# Patient Record
Sex: FEMALE | Race: WHITE | NOT HISPANIC OR LATINO | Employment: UNEMPLOYED | URBAN - NONMETROPOLITAN AREA
[De-identification: names, ages, dates, MRNs, and addresses within clinical notes are randomized per-mention and may not be internally consistent; named-entity substitution may affect disease eponyms.]

---

## 2017-10-04 ENCOUNTER — TRANSCRIBE ORDERS (OUTPATIENT)
Dept: ADMINISTRATIVE | Facility: HOSPITAL | Age: 37
End: 2017-10-04

## 2017-10-04 DIAGNOSIS — N63.0 BREAST MASS: Primary | ICD-10-CM

## 2017-10-19 ENCOUNTER — HOSPITAL ENCOUNTER (OUTPATIENT)
Dept: MAMMOGRAPHY | Facility: HOSPITAL | Age: 37
Discharge: HOME | End: 2017-10-19
Admitting: OBSTETRICS & GYNECOLOGY

## 2017-10-19 DIAGNOSIS — N63.0 BREAST MASS: ICD-10-CM

## 2017-10-19 PROCEDURE — G0279 TOMOSYNTHESIS, MAMMO: HCPCS

## 2017-10-19 PROCEDURE — G0204 DX MAMMO INCL CAD BI: HCPCS

## 2017-10-19 PROCEDURE — 77066 DX MAMMO INCL CAD BI: CPT | Performed by: RADIOLOGY

## 2017-10-19 PROCEDURE — 77062 BREAST TOMOSYNTHESIS BI: CPT | Performed by: RADIOLOGY

## 2017-12-08 ENCOUNTER — TRANSCRIBE ORDERS (OUTPATIENT)
Dept: ADMINISTRATIVE | Facility: HOSPITAL | Age: 37
End: 2017-12-08

## 2017-12-08 DIAGNOSIS — N63.0 LUMP OR MASS IN BREAST: Primary | ICD-10-CM

## 2017-12-21 ENCOUNTER — APPOINTMENT (OUTPATIENT)
Dept: ULTRASOUND IMAGING | Facility: HOSPITAL | Age: 37
End: 2017-12-21

## 2018-01-17 ENCOUNTER — HOSPITAL ENCOUNTER (OUTPATIENT)
Dept: ULTRASOUND IMAGING | Facility: HOSPITAL | Age: 38
Discharge: HOME | End: 2018-01-17

## 2018-01-23 ENCOUNTER — HOSPITAL ENCOUNTER (OUTPATIENT)
Dept: ULTRASOUND IMAGING | Facility: HOSPITAL | Age: 38
Discharge: HOME | End: 2018-01-23
Admitting: OBSTETRICS & GYNECOLOGY

## 2018-01-23 DIAGNOSIS — N63.0 LUMP OR MASS IN BREAST: ICD-10-CM

## 2018-01-23 PROCEDURE — 76642 ULTRASOUND BREAST LIMITED: CPT

## 2018-01-23 PROCEDURE — 76642 ULTRASOUND BREAST LIMITED: CPT | Performed by: RADIOLOGY

## 2018-07-16 ENCOUNTER — OFFICE VISIT (OUTPATIENT)
Dept: ORTHOPEDIC SURGERY | Facility: CLINIC | Age: 38
End: 2018-07-16

## 2018-07-16 ENCOUNTER — PREP FOR SURGERY (OUTPATIENT)
Dept: OTHER | Facility: HOSPITAL | Age: 38
End: 2018-07-16

## 2018-07-16 ENCOUNTER — TELEPHONE (OUTPATIENT)
Dept: ORTHOPEDIC SURGERY | Facility: CLINIC | Age: 38
End: 2018-07-16

## 2018-07-16 VITALS
SYSTOLIC BLOOD PRESSURE: 118 MMHG | HEIGHT: 71 IN | HEART RATE: 83 BPM | DIASTOLIC BLOOD PRESSURE: 69 MMHG | WEIGHT: 149 LBS | BODY MASS INDEX: 20.86 KG/M2

## 2018-07-16 DIAGNOSIS — G56.02 CARPAL TUNNEL SYNDROME OF LEFT WRIST: Primary | ICD-10-CM

## 2018-07-16 DIAGNOSIS — G56.02 LEFT CARPAL TUNNEL SYNDROME: Primary | ICD-10-CM

## 2018-07-16 PROCEDURE — 99204 OFFICE O/P NEW MOD 45 MIN: CPT | Performed by: ORTHOPAEDIC SURGERY

## 2018-07-16 NOTE — H&P
History and Physical    Patient: Keshia Jimenez  YOB: 1980  Date of encounter: 07/16/2018      History of Present Illness:   Keshia Jimenez is a 38 y.o. right-handed white female, nondiabetic, positive smoking history who presents with a 5-6 month history of hand pain numbness tingling and swelling.  No specific injury or trauma.  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  Notices it when she drives also she has to switch her hands back and forth.  Has been wearing wrist splints for about 6 weeks they've helped a little bit with night pain but still bothers her a lot during the day.  No significant change in weight otherwise healthy.    PMH:   Patient Active Problem List   Diagnosis   • Left carpal tunnel syndrome       PSH:  Past Surgical History:   Procedure Laterality Date   • HYSTERECTOMY     • TONSILLECTOMY         Allergies:   No Known Allergies    Medications:     Current Outpatient Prescriptions:   •  METHADONE HCL PO, Take 35 mg by mouth., Disp: , Rfl:     Social History:     Social History     Occupational History   • Not on file.     Social History Main Topics   • Smoking status: Current Every Day Smoker   • Smokeless tobacco: Never Used   • Alcohol use No   • Drug use: Unknown   • Sexual activity: Defer      Social History     Social History Narrative   • No narrative on file       Family History:     Family History   Problem Relation Age of Onset   • Diabetes Father    • Breast cancer Neg Hx        Review of Systems:   Review of Systems   HENT: Negative.    Eyes: Negative.    Respiratory: Negative.    Cardiovascular: Negative.    Gastrointestinal: Negative.    Endocrine: Negative.    Genitourinary: Negative.    Musculoskeletal: Negative.    Skin: Negative.    Neurological: Positive for weakness and numbness.   Hematological: Negative.    Psychiatric/Behavioral: Negative.        Physical Exam:   Constitutional: Patient is  oriented to person, place, and time. Patient appears well-developed and well-nourished. No acute distress.   There were no vitals filed for this visit.    HENT:   Head: Normocephalic and atraumatic.   Right Ear: External ear normal.   Left Ear: External ear normal.   Eyes: EOM are normal. Right eye exhibits no discharge. Left eye exhibits no discharge.   Neck: Normal range of motion. Neck supple.   Cardiovascular: Regular rhythm and normal heart sounds.    No murmur heard.  Pulmonary/Chest: Effort normal. No respiratory distress.Clear to ascultation bilaterally.  Abdominal: Soft.   Musculoskeletal:Hands show no obvious swelling the skin is intact.  She has reasonable  strength bilaterally.  Negative Tinel sign.  Positive Phalen's test about 20 seconds.  Good range of motion of elbow and shoulders.    Neurological: Patient is alert and oriented to person, place, and time.   Skin: Skin is warm and dry. No rash noted. Patient is not diaphoretic.   Psychiatric: Patinet has a normal mood and affect. Patients behavior is normal. Thought content normal.     Radiology:     EMG nerve conduction velocity test are consistent with fairly significant carpal tunnel syndrome bilaterally       Assessment  Bilateral carpal tunnel syndrome clinically left is greater than right.  She's been recalcitrant to conservative treatment including splinting and NSAIDs.       Plan:  Plan for surgical release of left carpal tunnel.  The risk and benefits were discussed including infection, bleeding, failure to relieve all her symptoms, recurrence, thenar eminence pain, local nerve damage, prolonged rehabilitation etc. patient appears be well where the risk and benefits and consents to procedure.  We'll plan to do this as an outpatient in about 9 days.

## 2018-07-16 NOTE — PROGRESS NOTES
History and Physical      Patient: Keshia Jimenez  YOB: 1980  Date of Encounter: 07/16/2018      HPI: 38-year-old right-handed white female, nondiabetic, positive smoking history who presents with a 5-6 month history of hand pain numbness tingling and swelling.  No specific injury or trauma.  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  Notices it when she drives also she has to switch her hands back and forth.  Has been wearing wrist splints for about 6 weeks they've helped a little bit with night pain but still bothers her a lot during the day.  No significant change in weight otherwise healthy.        History reviewed. No pertinent past medical history.    Past Surgical History:   Procedure Laterality Date   • HYSTERECTOMY     • TONSILLECTOMY         Family History   Problem Relation Age of Onset   • Diabetes Father    • Breast cancer Neg Hx        Social History     Social History   • Marital status:      Spouse name: N/A   • Number of children: N/A   • Years of education: N/A     Occupational History   • Not on file.     Social History Main Topics   • Smoking status: Current Every Day Smoker   • Smokeless tobacco: Never Used   • Alcohol use No   • Drug use: Unknown   • Sexual activity: Defer     Other Topics Concern   • Not on file     Social History Narrative   • No narrative on file       Current Outpatient Prescriptions   Medication Sig Dispense Refill   • METHADONE HCL PO Take 35 mg by mouth.       No current facility-administered medications for this visit.        No Known Allergies    ROS no neck or back problems.  No shortness of breath tightness or wheezing.  No abdominal pain and nausea or vomiting.  No change in mood nervousness or anxiety.  No urinary symptoms.  No headaches fevers chills.  Review of systems is unremarkable other than chief complaint      Vitals:    07/16/18 1008   BP: 118/69   Pulse: 83   Weight: 67.6  "kg (149 lb)   Height: 180.3 cm (71\")     Body mass index is 20.78 kg/m².          Physical Exam   Normal weight white female in no apparent acute distress awake alert and oriented ×3  HEENT normocephalic atraumatic, pupils equal round reactive to light, extraocular movements are intact, sclerae anicteric, tongue is midline with good dentition  Neck is nontender good range of motion no JVD or bruits  Lungs equal bilaterally  Abdomen soft nontender  Heart regular rate and rhythm without significant rubs murmurs or gallops  Lower extremities show no focal motor or neurologic deficits.  There is no obvious swelling.  Normal patellar tendon reflexes  Hands show no obvious swelling the skin is intact.  She has reasonable  strength bilaterally.  Negative Tinel sign.  Positive Phalen's test about 20 seconds.  Good range of motion of elbow and shoulders.    Radiology/Labs:     EMG nerve conduction velocity test are consistent with fairly significant carpal tunnel syndrome bilaterally      Procedures          Assessment: Bilateral carpal tunnel syndrome clinically left is greater than right.  She's been recalcitrant to conservative treatment including splinting and NSAIDs.      Plan: Plan for surgical release of left carpal tunnel.  The risk and benefits were discussed including infection, bleeding, failure to relieve all her symptoms, recurrence, thenar eminence pain, local nerve damage, prolonged rehabilitation etc. patient appears be well where the risk and benefits and consents to procedure.  We'll plan to do this as an outpatient in about 9 days.              Discussion/Summary:          "

## 2018-07-18 ENCOUNTER — TELEPHONE (OUTPATIENT)
Dept: ORTHOPEDIC SURGERY | Facility: CLINIC | Age: 38
End: 2018-07-18

## 2018-07-18 NOTE — TELEPHONE ENCOUNTER
Patient called today asking if she could take her methadone the morning of her SX. She was told that her pre-op nurses would advise her as to what she can or can not take the morning of her SX. Patient verbalized understanding.

## 2018-07-20 ENCOUNTER — APPOINTMENT (OUTPATIENT)
Dept: PREADMISSION TESTING | Facility: HOSPITAL | Age: 38
End: 2018-07-20

## 2018-07-20 LAB
ANION GAP SERPL CALCULATED.3IONS-SCNC: 2.9 MMOL/L (ref 3.6–11.2)
BUN BLD-MCNC: 14 MG/DL (ref 7–21)
BUN/CREAT SERPL: 20 (ref 7–25)
CALCIUM SPEC-SCNC: 9.1 MG/DL (ref 7.7–10)
CHLORIDE SERPL-SCNC: 106 MMOL/L (ref 99–112)
CO2 SERPL-SCNC: 28.1 MMOL/L (ref 24.3–31.9)
CREAT BLD-MCNC: 0.7 MG/DL (ref 0.43–1.29)
DEPRECATED RDW RBC AUTO: 44 FL (ref 37–54)
ERYTHROCYTE [DISTWIDTH] IN BLOOD BY AUTOMATED COUNT: 13.6 % (ref 11.5–14.5)
GFR SERPL CREATININE-BSD FRML MDRD: 94 ML/MIN/1.73
GLUCOSE BLD-MCNC: 91 MG/DL (ref 70–110)
HCT VFR BLD AUTO: 43.3 % (ref 37–47)
HGB BLD-MCNC: 14.6 G/DL (ref 12–16)
MCH RBC QN AUTO: 30 PG (ref 27–33)
MCHC RBC AUTO-ENTMCNC: 33.7 G/DL (ref 33–37)
MCV RBC AUTO: 89.1 FL (ref 80–94)
OSMOLALITY SERPL CALC.SUM OF ELEC: 273.9 MOSM/KG (ref 273–305)
PLATELET # BLD AUTO: 248 10*3/MM3 (ref 130–400)
PMV BLD AUTO: 10.2 FL (ref 6–10)
POTASSIUM BLD-SCNC: 4.1 MMOL/L (ref 3.5–5.3)
RBC # BLD AUTO: 4.86 10*6/MM3 (ref 4.2–5.4)
SODIUM BLD-SCNC: 137 MMOL/L (ref 135–153)
WBC NRBC COR # BLD: 7.38 10*3/MM3 (ref 4.5–12.5)

## 2018-07-20 PROCEDURE — 36415 COLL VENOUS BLD VENIPUNCTURE: CPT

## 2018-07-20 PROCEDURE — 85027 COMPLETE CBC AUTOMATED: CPT | Performed by: ORTHOPAEDIC SURGERY

## 2018-07-20 PROCEDURE — 80048 BASIC METABOLIC PNL TOTAL CA: CPT | Performed by: ORTHOPAEDIC SURGERY

## 2018-07-20 NOTE — DISCHARGE INSTRUCTIONS
TAKE the following medications the morning of surgery:  All heart or blood pressure medications    Please discontinue all blood thinners and anticoagulants (except aspirin) prior to surgery as per your surgeon and cardiologist instructions.  Aspirin may be continued up to the day prior to surgery.    HOLD all diabetic medications the morning of surgery as order by physician.    Please follow instructions on use of prep cloths provided by nurse. Return instruction sheet with stickers attached to pre-op nurse on day of surgery.    Arrival time for surgery on 7/25/2018 is 0830 am.    General Instructions:  • Do NOT eat or drink after midnight 7/24/2018 which includes water, mints, or gum.  • You may brush your teeth. Dental appliances that are removable must be taken out day of surgery.  • Do NOT smoke, chew tobacco, or drink alcohol within 24 hours prior to surgery.  • Bring medications in original bottles, any inhalers and if applicable your C-PAP/BI-PAP machine  • Bring any papers given to you in the doctor’s office  • Wear clean, comfortable clothes and socks  • Do NOT wear contact lenses or make-up or dark nail polish.  Bring a case for your glasses if applicable.  • Bring crutches or walker if applicable  • Leave all other valuables and jewelry at home  • If you were given a blood bank armband, continue to wear it until discharged.    Preventing a Surgical Site Infection:  • Shower the night before surgery (unless instructed otherwise) using a fresh bar of anti-bacterial soap (such as Dial) and clean washcloth.  Dry with a clean towel and dress in clean clothing.  • For 2 to 3 days before surgery, avoid shaving with a razor near where you will have surgery because the razor can irritate skin and make it easier to develop an infection.  Ask your surgeon if you will be receiving antibiotics prior to surgery.  • Make sure you, your family, and all healthcare providers clean their hands with soap and water or an  alcohol-based hand  before caring for you or your wound.  • If at all possible, quit smoking as many days before surgery as you can.    Day of Surgery:  Upon arrival, a pre-op nurse and anesthesiologist will review your health history, obtain vital signs, and answer questions you may have.  The only belongings needed at this time will be your home medications and if applicable you C-PAP/BI-PAP machine.  If you are staying overnight, your family can leave the rest of your belongings in the car and bring them to your room later.  A pre-op nurse will start an IV and you may receive medication in preparation for surgery.  Due to patient privacy and limited space, only one member of your family will be able to accompany you in the pre-op area.  While you are in surgery your family should notify the waiting room  if they leave the waiting room area and provide a contact number.  Please be aware that surgery does come with discomfort.  We want to make every effort to control your discomfort so please discuss any uncontrolled symptoms with your nurse.  Your doctor will most likely have prescribed pain medications.  If you are going home after surgery you will receive individualized written care instructions before being discharged.  A responsible adult must drive you to and from the hospital on the day of surgery and stay with you for 24 hours.  If you are staying overnight following surgery, you will be transported to your hospital room following the recovery period.

## 2018-07-24 ENCOUNTER — TELEPHONE (OUTPATIENT)
Dept: ORTHOPEDIC SURGERY | Facility: CLINIC | Age: 38
End: 2018-07-24

## 2018-07-25 ENCOUNTER — HOSPITAL ENCOUNTER (OUTPATIENT)
Facility: HOSPITAL | Age: 38
Setting detail: HOSPITAL OUTPATIENT SURGERY
Discharge: HOME | End: 2018-07-25
Attending: ORTHOPAEDIC SURGERY | Admitting: ORTHOPAEDIC SURGERY

## 2018-07-25 ENCOUNTER — ANESTHESIA EVENT (OUTPATIENT)
Dept: PERIOP | Facility: HOSPITAL | Age: 38
End: 2018-07-25

## 2018-07-25 ENCOUNTER — ANESTHESIA (OUTPATIENT)
Dept: PERIOP | Facility: HOSPITAL | Age: 38
End: 2018-07-25

## 2018-07-25 VITALS
HEART RATE: 66 BPM | BODY MASS INDEX: 20.86 KG/M2 | SYSTOLIC BLOOD PRESSURE: 111 MMHG | OXYGEN SATURATION: 99 % | HEIGHT: 71 IN | WEIGHT: 149 LBS | DIASTOLIC BLOOD PRESSURE: 58 MMHG | RESPIRATION RATE: 16 BRPM | TEMPERATURE: 97.6 F

## 2018-07-25 PROCEDURE — 25010000003 CEFAZOLIN PER 500 MG: Performed by: PHYSICIAN ASSISTANT

## 2018-07-25 PROCEDURE — 25010000002 KETOROLAC TROMETHAMINE PER 15 MG: Performed by: NURSE ANESTHETIST, CERTIFIED REGISTERED

## 2018-07-25 PROCEDURE — 25010000002 MIDAZOLAM PER 1 MG: Performed by: NURSE ANESTHETIST, CERTIFIED REGISTERED

## 2018-07-25 PROCEDURE — 25010000002 ONDANSETRON PER 1 MG: Performed by: NURSE ANESTHETIST, CERTIFIED REGISTERED

## 2018-07-25 PROCEDURE — 64721 CARPAL TUNNEL SURGERY: CPT | Performed by: ORTHOPAEDIC SURGERY

## 2018-07-25 PROCEDURE — 25010000002 PROPOFOL 10 MG/ML EMULSION: Performed by: NURSE ANESTHETIST, CERTIFIED REGISTERED

## 2018-07-25 PROCEDURE — 25010000002 FENTANYL CITRATE (PF) 100 MCG/2ML SOLUTION: Performed by: NURSE ANESTHETIST, CERTIFIED REGISTERED

## 2018-07-25 RX ORDER — MAGNESIUM HYDROXIDE 1200 MG/15ML
LIQUID ORAL AS NEEDED
Status: DISCONTINUED | OUTPATIENT
Start: 2018-07-25 | End: 2018-07-25 | Stop reason: HOSPADM

## 2018-07-25 RX ORDER — OXYCODONE HYDROCHLORIDE AND ACETAMINOPHEN 5; 325 MG/1; MG/1
1 TABLET ORAL ONCE AS NEEDED
Status: DISCONTINUED | OUTPATIENT
Start: 2018-07-25 | End: 2018-07-25 | Stop reason: HOSPADM

## 2018-07-25 RX ORDER — KETOROLAC TROMETHAMINE 30 MG/ML
INJECTION, SOLUTION INTRAMUSCULAR; INTRAVENOUS AS NEEDED
Status: DISCONTINUED | OUTPATIENT
Start: 2018-07-25 | End: 2018-07-25 | Stop reason: SURG

## 2018-07-25 RX ORDER — MIDAZOLAM HYDROCHLORIDE 1 MG/ML
1 INJECTION INTRAMUSCULAR; INTRAVENOUS
Status: DISCONTINUED | OUTPATIENT
Start: 2018-07-25 | End: 2018-07-25 | Stop reason: HOSPADM

## 2018-07-25 RX ORDER — IPRATROPIUM BROMIDE AND ALBUTEROL SULFATE 2.5; .5 MG/3ML; MG/3ML
3 SOLUTION RESPIRATORY (INHALATION) ONCE AS NEEDED
Status: DISCONTINUED | OUTPATIENT
Start: 2018-07-25 | End: 2018-07-25 | Stop reason: HOSPADM

## 2018-07-25 RX ORDER — FENTANYL CITRATE 50 UG/ML
INJECTION, SOLUTION INTRAMUSCULAR; INTRAVENOUS AS NEEDED
Status: DISCONTINUED | OUTPATIENT
Start: 2018-07-25 | End: 2018-07-25 | Stop reason: SURG

## 2018-07-25 RX ORDER — MEPERIDINE HYDROCHLORIDE 50 MG/ML
12.5 INJECTION INTRAMUSCULAR; INTRAVENOUS; SUBCUTANEOUS
Status: DISCONTINUED | OUTPATIENT
Start: 2018-07-25 | End: 2018-07-25 | Stop reason: HOSPADM

## 2018-07-25 RX ORDER — BUPIVACAINE HYDROCHLORIDE AND EPINEPHRINE 5; 5 MG/ML; UG/ML
INJECTION, SOLUTION EPIDURAL; INTRACAUDAL; PERINEURAL AS NEEDED
Status: DISCONTINUED | OUTPATIENT
Start: 2018-07-25 | End: 2018-07-25 | Stop reason: HOSPADM

## 2018-07-25 RX ORDER — MIDAZOLAM HYDROCHLORIDE 1 MG/ML
2 INJECTION INTRAMUSCULAR; INTRAVENOUS
Status: DISCONTINUED | OUTPATIENT
Start: 2018-07-25 | End: 2018-07-25 | Stop reason: HOSPADM

## 2018-07-25 RX ORDER — FAMOTIDINE 10 MG/ML
INJECTION, SOLUTION INTRAVENOUS AS NEEDED
Status: DISCONTINUED | OUTPATIENT
Start: 2018-07-25 | End: 2018-07-25 | Stop reason: SURG

## 2018-07-25 RX ORDER — ONDANSETRON 2 MG/ML
INJECTION INTRAMUSCULAR; INTRAVENOUS AS NEEDED
Status: DISCONTINUED | OUTPATIENT
Start: 2018-07-25 | End: 2018-07-25 | Stop reason: SURG

## 2018-07-25 RX ORDER — LIDOCAINE HYDROCHLORIDE 20 MG/ML
INJECTION, SOLUTION INFILTRATION; PERINEURAL AS NEEDED
Status: DISCONTINUED | OUTPATIENT
Start: 2018-07-25 | End: 2018-07-25 | Stop reason: SURG

## 2018-07-25 RX ORDER — FENTANYL CITRATE 50 UG/ML
50 INJECTION, SOLUTION INTRAMUSCULAR; INTRAVENOUS
Status: DISCONTINUED | OUTPATIENT
Start: 2018-07-25 | End: 2018-07-25 | Stop reason: HOSPADM

## 2018-07-25 RX ORDER — IBUPROFEN 800 MG/1
800 TABLET ORAL EVERY 8 HOURS PRN
Qty: 24 TABLET | Refills: 1 | Status: SHIPPED | OUTPATIENT
Start: 2018-07-25 | End: 2018-08-07

## 2018-07-25 RX ORDER — SODIUM CHLORIDE 0.9 % (FLUSH) 0.9 %
1-10 SYRINGE (ML) INJECTION AS NEEDED
Status: DISCONTINUED | OUTPATIENT
Start: 2018-07-25 | End: 2018-07-25 | Stop reason: HOSPADM

## 2018-07-25 RX ORDER — MIDAZOLAM HYDROCHLORIDE 1 MG/ML
INJECTION INTRAMUSCULAR; INTRAVENOUS AS NEEDED
Status: DISCONTINUED | OUTPATIENT
Start: 2018-07-25 | End: 2018-07-25 | Stop reason: SURG

## 2018-07-25 RX ORDER — SODIUM CHLORIDE, SODIUM LACTATE, POTASSIUM CHLORIDE, CALCIUM CHLORIDE 600; 310; 30; 20 MG/100ML; MG/100ML; MG/100ML; MG/100ML
125 INJECTION, SOLUTION INTRAVENOUS CONTINUOUS
Status: DISCONTINUED | OUTPATIENT
Start: 2018-07-25 | End: 2018-07-25 | Stop reason: HOSPADM

## 2018-07-25 RX ORDER — ONDANSETRON 2 MG/ML
4 INJECTION INTRAMUSCULAR; INTRAVENOUS ONCE AS NEEDED
Status: DISCONTINUED | OUTPATIENT
Start: 2018-07-25 | End: 2018-07-25 | Stop reason: HOSPADM

## 2018-07-25 RX ORDER — PROPOFOL 10 MG/ML
VIAL (ML) INTRAVENOUS AS NEEDED
Status: DISCONTINUED | OUTPATIENT
Start: 2018-07-25 | End: 2018-07-25 | Stop reason: SURG

## 2018-07-25 RX ADMIN — FENTANYL CITRATE 100 MCG: 50 INJECTION INTRAMUSCULAR; INTRAVENOUS at 09:30

## 2018-07-25 RX ADMIN — FAMOTIDINE 20 MG: 10 INJECTION, SOLUTION INTRAVENOUS at 09:38

## 2018-07-25 RX ADMIN — SODIUM CHLORIDE, POTASSIUM CHLORIDE, SODIUM LACTATE AND CALCIUM CHLORIDE 125 ML/HR: 600; 310; 30; 20 INJECTION, SOLUTION INTRAVENOUS at 09:00

## 2018-07-25 RX ADMIN — KETOROLAC TROMETHAMINE 30 MG: 30 INJECTION, SOLUTION INTRAMUSCULAR; INTRAVENOUS at 09:38

## 2018-07-25 RX ADMIN — PROPOFOL 150 MG: 10 INJECTION, EMULSION INTRAVENOUS at 09:38

## 2018-07-25 RX ADMIN — ONDANSETRON 4 MG: 2 INJECTION, SOLUTION INTRAMUSCULAR; INTRAVENOUS at 09:38

## 2018-07-25 RX ADMIN — MIDAZOLAM HYDROCHLORIDE 2 MG: 1 INJECTION, SOLUTION INTRAMUSCULAR; INTRAVENOUS at 09:30

## 2018-07-25 RX ADMIN — CEFAZOLIN SODIUM 2 G: 2 SOLUTION INTRAVENOUS at 09:30

## 2018-07-25 RX ADMIN — LIDOCAINE HYDROCHLORIDE 40 MG: 20 INJECTION, SOLUTION INFILTRATION; PERINEURAL at 09:38

## 2018-07-25 NOTE — ANESTHESIA PROCEDURE NOTES
Airway    General Information and Staff    CRNA: JESICA ROGERS    Indications and Patient Condition    Preoxygenated: yes      Final Airway Details  Final airway type: supraglottic airway      Successful airway: classic  Size 3    Number of attempts at approach: 1

## 2018-07-25 NOTE — ANESTHESIA POSTPROCEDURE EVALUATION
Patient: Keshia Jimenez    Procedure Summary     Date:  07/25/18 Room / Location:  [facility] OR 03 /  [facility]    Anesthesia Start:  0930 Anesthesia Stop:  1002    Procedure:  CARPAL TUNNEL RELEASE (Left Wrist) Diagnosis:       Carpal tunnel syndrome of left wrist      (Carpal tunnel syndrome of left wrist [G56.02])    Surgeon:  Isaac Aiken MD Provider:  Chance Banks MD    Anesthesia Type:  general ASA Status:  3          Anesthesia Type: general  Last vitals  BP   (!) 88/46 (07/25/18 1008)   Temp   97.4 °F (36.3 °C) (07/25/18 1003)   Pulse   61 (07/25/18 1008)   Resp   13 (07/25/18 1008)     SpO2   98 % (07/25/18 1008)     Post Anesthesia Care and Evaluation    Patient location during evaluation: PHASE II  Patient participation: complete - patient participated  Level of consciousness: awake and alert  Pain score: 1  Pain management: adequate  Airway patency: patent  Anesthetic complications: No anesthetic complications  PONV Status: controlled  Cardiovascular status: acceptable  Respiratory status: acceptable  Hydration status: acceptable

## 2018-07-25 NOTE — ANESTHESIA PREPROCEDURE EVALUATION
Anesthesia Evaluation     no history of anesthetic complications:  NPO Solid Status: > 8 hours  NPO Liquid Status: > 8 hours           Airway   Mallampati: I  TM distance: >3 FB  Neck ROM: full  No difficulty expected  Dental - normal exam     Pulmonary - normal exam   (+) a smoker Current Abstained day of surgery,   Cardiovascular - normal exam        Neuro/Psych  (+) numbness, psychiatric history,     GI/Hepatic/Renal/Endo    (+)   hepatitis C, liver disease,     Musculoskeletal     Abdominal  - normal exam    Bowel sounds: normal.   Substance History      OB/GYN          Other                        Anesthesia Plan    ASA 3     general     intravenous induction   Anesthetic plan and risks discussed with patient.

## 2018-08-07 ENCOUNTER — OFFICE VISIT (OUTPATIENT)
Dept: ORTHOPEDIC SURGERY | Facility: CLINIC | Age: 38
End: 2018-08-07

## 2018-08-07 VITALS — HEIGHT: 71 IN | WEIGHT: 149 LBS | BODY MASS INDEX: 20.86 KG/M2

## 2018-08-07 DIAGNOSIS — Z98.890 S/P BILATERAL CARPAL TUNNEL RELEASE: Primary | ICD-10-CM

## 2018-08-07 DIAGNOSIS — G56.02 LEFT CARPAL TUNNEL SYNDROME: ICD-10-CM

## 2018-08-07 PROCEDURE — 99024 POSTOP FOLLOW-UP VISIT: CPT | Performed by: ORTHOPAEDIC SURGERY

## 2018-08-07 NOTE — PROGRESS NOTES
"Keshia Jimenez   :1980    Date of encounter:2018        HPI:  Keshia Jimenez is a 38 y.o. Female who returns here today status post left carpal tunnel release.  Date of surgery was 2018.  She's in 2 weeks postop.  She states that the numbness and tingling in the fingers have completely subsided.  She does complain of some mild pain along the incision.    PMH:   Patient Active Problem List   Diagnosis   • Left carpal tunnel syndrome   • Carpal tunnel syndrome of left wrist       Exam:  General Appearance:    38 y.o. female  cooperative, in no acute distress.  Alert and oriented x 3,                   Vitals:    18 1400   Weight: 67.6 kg (149 lb)   Height: 180.3 cm (71\")          Body mass index is 20.78 kg/m².     Examination of the left wrist reveals a clean and dry incision without surrounding erythema or active drainage.  She has good range of motion.  Her neurovascular status is intact.      Assessment    ICD-10-CM ICD-9-CM   1. S/p bilateral carpal tunnel release Z98.890 V45.89   2. Left carpal tunnel syndrome G56.02 354.0       Plan:   A 38-year-old female 2 weeks status post carpal tunnel release of the left wrist.  Her incision looks good without signs of infection.  We do advise keep covered with a Band-Aid during the day for the next week.  We have advised holding off on getting this wet for the next 48 hours.  She can begin to wean out of the brace and slowly ease back into activities as tolerated.  We'll have her follow back in 4 weeks for reevaluation.    Written by, Steffanie EDWARD, acting as a scribe for Dr. Aiken                    "

## 2019-06-10 ENCOUNTER — OFFICE VISIT (OUTPATIENT)
Dept: ORTHOPEDIC SURGERY | Facility: CLINIC | Age: 39
End: 2019-06-10

## 2019-06-10 VITALS
DIASTOLIC BLOOD PRESSURE: 76 MMHG | BODY MASS INDEX: 20.86 KG/M2 | SYSTOLIC BLOOD PRESSURE: 117 MMHG | WEIGHT: 149 LBS | HEART RATE: 85 BPM | HEIGHT: 71 IN

## 2019-06-10 DIAGNOSIS — G56.01 RIGHT CARPAL TUNNEL SYNDROME: Primary | ICD-10-CM

## 2019-06-10 PROCEDURE — 99214 OFFICE O/P EST MOD 30 MIN: CPT | Performed by: ORTHOPAEDIC SURGERY

## 2019-06-10 RX ORDER — LISDEXAMFETAMINE DIMESYLATE 20 MG/1
20 CAPSULE ORAL DAILY
Refills: 0 | COMMUNITY
Start: 2019-06-05

## 2019-06-10 RX ORDER — CEFAZOLIN SODIUM 2 G/50ML
2 SOLUTION INTRAVENOUS ONCE
Status: CANCELLED | OUTPATIENT
Start: 2019-06-19 | End: 2019-06-10

## 2019-06-10 NOTE — H&P
History and Physical    Patient: Keshia Jimenez  YOB: 1980  Date of encounter: 06/10/2019    Chief Complaint   Patient presents with   • Right Wrist - Follow-up, Pain, Numbness         History of Present Illness:   Keshia Jimenez is a 38 y.o. right-hand-dominant white female who presents for evaluation of her right hand.  She complains of numbness and pain in her hand.  This is been going on for about a year and a half now.  Wakes her up at night she uses a brace which helps her sleep at night.  Sometimes she gets sharp pains.  We have previously done a left carpal tunnel release a year ago and she is doing quite well with that.  Positive smoking history.  Does work as a caregiver for elderly patients.  Patient had previous EMG nerve conduction tests which were consistent with significant bilateral carpal tunnel        PMH:   Patient Active Problem List   Diagnosis   • Left carpal tunnel syndrome   • Carpal tunnel syndrome of left wrist       PSH:  Past Surgical History:   Procedure Laterality Date   • CARPAL TUNNEL RELEASE Left 7/25/2018    Procedure: CARPAL TUNNEL RELEASE;  Surgeon: Isaac Aiken MD;  Location: [facility];  Service: Orthopedics   • COLONOSCOPY     • ENDOMETRIAL ABLATION     • HYSTERECTOMY     • TONSILLECTOMY         Allergies:   No Known Allergies    Medications:     Current Outpatient Medications:   •  VYVANSE 20 MG capsule, Take 20 mg by mouth Daily, Disp: , Rfl: 0  •  METHADONE HCL PO, Take 35 mg by mouth Daily., Disp: , Rfl:     Social History:     Social History     Occupational History   • Not on file   Tobacco Use   • Smoking status: Current Every Day Smoker     Packs/day: 1.00   • Smokeless tobacco: Never Used   Substance and Sexual Activity   • Alcohol use: No   • Drug use: No   • Sexual activity: Defer      Social History     Social History Narrative   • Not on file       Family History:     Family History   Problem Relation Age of Onset   • Diabetes Father     • COPD Mother    • Hypertension Mother    • Cancer Maternal Aunt    • Diabetes Maternal Aunt    • Hypertension Maternal Grandmother    • COPD Maternal Grandfather    • Breast cancer Neg Hx        Review of Systems:   Review of Systems   Constitutional: Negative for activity change, appetite change, chills, fatigue, fever and unexpected weight change.   HENT: Negative.    Eyes: Negative.    Respiratory: Negative.    Cardiovascular: Negative.    Gastrointestinal: Negative.    Endocrine: Negative.    Genitourinary: Negative.    Musculoskeletal: Negative.    Skin: Negative.    Neurological: Positive for weakness and numbness.   Hematological: Negative.    Psychiatric/Behavioral: Negative.        Physical Exam:   Constitutional: Patient is oriented to person, place, and time. Patient appears well-developed and well-nourished. No acute distress.   Vitals:    06/10/19 0913   BP: 117/76   Pulse: 85       HENT:   Head: Normocephalic and atraumatic.   Right Ear: External ear normal.   Left Ear: External ear normal.   Eyes: EOM are normal. Right eye exhibits no discharge. Left eye exhibits no discharge.   Neck: Normal range of motion. Neck supple.   Cardiovascular: Regular rhythm and normal heart sounds.    No murmur heard.  Pulmonary/Chest: Effort normal. No respiratory distress.Clear to ascultation bilaterally.  Abdominal: Soft.   Musculoskeletal:Hands left hand shows a well-healed midline incision the base of her palm.  She has good range of motion of fingers without swelling.  Right hand shows no obvious swelling with good range of motion of fingers and wrist.  Negative Tinel sign.  Positive Phalen's test about 20 seconds.  Subjectively decreased sensation in median nerve distribution.        Neurological: Patient is alert and oriented to person, place, and time.   Skin: Skin is warm and dry. No rash noted. Patient is not diaphoretic.   Psychiatric: Patinet has a normal mood and affect. Patients behavior is normal.  Thought content normal.     Radiology:     EMG nerve conduction velocity test done last year were consistent with fairly significant carpal tunnel syndrome bilaterally        Assessment    ICD-10-CM ICD-9-CM   1. Right carpal tunnel syndrome G56.01 354.0       Plan:  Right carpal tunnel syndrome.  Has been recalcitrant conservative treatment.  Has previously undergone left carpal tunnel release with good results.  We discussed the risk and benefits including infection, bleeding, failure to relieve all her symptoms, local nerve damage, prolonged rehabilitation, recurrence etc.  Patient has done well with her left carpal tunnel release and would like to proceed with a right carpal tunnel release.  We will plan to do this as an outpatient on the 19th    Written by, Steffanie EDWARD, acting as a scribe for Dr. Aiken

## 2019-06-10 NOTE — PROGRESS NOTES
"Patient: Keshia Jimenez    YOB: 1980    Chief Complaint   Patient presents with   • Right Wrist - Follow-up, Pain, Numbness         History of Present Illness: Patient is a 38-year-old right-hand-dominant white female who presents for evaluation of her right hand.  She complains of numbness and pain in her hand.  This is been going on for about a year and a half now.  Wakes her up at night she uses a brace which helps her sleep at night.  Sometimes she gets sharp pains.  We have previously done a left carpal tunnel release a year ago and she is doing quite well with that.  Positive smoking history.  Does work as a caregiver for elderly patients.  Patient had previous EMG nerve conduction tests which were consistent with significant bilateral carpal tunnel  Past Medical History:   Diagnosis Date   • Anxiety    • Hepatitis C    • Irritable bowel syndrome (IBS)         Social History     Socioeconomic History   • Marital status:      Spouse name: Not on file   • Number of children: Not on file   • Years of education: Not on file   • Highest education level: Not on file   Tobacco Use   • Smoking status: Current Every Day Smoker     Packs/day: 1.00   • Smokeless tobacco: Never Used   Substance and Sexual Activity   • Alcohol use: No   • Drug use: No   • Sexual activity: Defer           Physical Exam: 38 y.o. female  General Appearance:    Alert and oriented x 3, cooperative, in no acute distress                   Vitals:    06/10/19 0913   Weight: 67.6 kg (149 lb)   Height: 180.3 cm (71\")          HEENT normocephalic atraumatic pupils equal round react to light extraocular's are intact sclera anicteric tongue is midline with good dentition    Neck is nontender with good range of motion no JVD,  Lungs clear and equal bilaterally  Heart regular rate and rhythm without significant rubs murmurs or gallops  Abdomen soft nontender  Lower extremities show no focal motor or neurologic deficits.  No obvious " swelling  Hands left hand shows a well-healed midline incision the base of her palm.  She has good range of motion of fingers without swelling.  Right hand shows no obvious swelling with good range of motion of fingers and wrist.  Negative Tinel sign.  Positive Phalen's test about 20 seconds.  Subjectively decreased sensation in median nerve distribution.      Radiology:     EMG nerve conduction velocity test done last year were consistent with fairly significant carpal tunnel syndrome bilaterally        Assessment/Plan: Right carpal tunnel syndrome.  Has been recalcitrant conservative treatment.  Has previously undergone left carpal tunnel release with good results.  We discussed the risk and benefits including infection, bleeding, failure to relieve all her symptoms, local nerve damage, prolonged rehabilitation, recurrence etc.  Patient has done well with her left carpal tunnel release and would like to proceed with a right carpal tunnel release.  We will plan to do this as an outpatient on the 19th      I advised Keshia of the risks of continuing to use tobacco, and I provided her with tobacco cessation educational materials in the After Visit Summary.     During this visit, I spent 1 minutes counseling the patient regarding tobacco cessation.        Patient's Body mass index is 20.78 kg/m². BMI is within normal parameters. No follow-up required..      Discussion/Summary:                This chart was completed utilizing the dragon speech recognition software.  Grammatical errors, random word insertions, pronoun errors, and incomplete sentences or occasional consequences of the system due to software limitations, ambient noise, and hardware issues.  Any questions or concerns about the content, text, or information contained within the body of this dictation should be directly addressed to the physician for clarification        This document was signed by Isaac Aiken M.D. Sonya 10, 2019 9:26 AM

## 2019-06-10 NOTE — H&P (VIEW-ONLY)
History and Physical    Patient: Keshia Jimenez  YOB: 1980  Date of encounter: 06/10/2019    Chief Complaint   Patient presents with   • Right Wrist - Follow-up, Pain, Numbness         History of Present Illness:   Keshia Jimenez is a 38 y.o. right-hand-dominant white female who presents for evaluation of her right hand.  She complains of numbness and pain in her hand.  This is been going on for about a year and a half now.  Wakes her up at night she uses a brace which helps her sleep at night.  Sometimes she gets sharp pains.  We have previously done a left carpal tunnel release a year ago and she is doing quite well with that.  Positive smoking history.  Does work as a caregiver for elderly patients.  Patient had previous EMG nerve conduction tests which were consistent with significant bilateral carpal tunnel        PMH:   Patient Active Problem List   Diagnosis   • Left carpal tunnel syndrome   • Carpal tunnel syndrome of left wrist       PSH:  Past Surgical History:   Procedure Laterality Date   • CARPAL TUNNEL RELEASE Left 7/25/2018    Procedure: CARPAL TUNNEL RELEASE;  Surgeon: Isaac Aiken MD;  Location: [facility];  Service: Orthopedics   • COLONOSCOPY     • ENDOMETRIAL ABLATION     • HYSTERECTOMY     • TONSILLECTOMY         Allergies:   No Known Allergies    Medications:     Current Outpatient Medications:   •  VYVANSE 20 MG capsule, Take 20 mg by mouth Daily, Disp: , Rfl: 0  •  METHADONE HCL PO, Take 35 mg by mouth Daily., Disp: , Rfl:     Social History:     Social History     Occupational History   • Not on file   Tobacco Use   • Smoking status: Current Every Day Smoker     Packs/day: 1.00   • Smokeless tobacco: Never Used   Substance and Sexual Activity   • Alcohol use: No   • Drug use: No   • Sexual activity: Defer      Social History     Social History Narrative   • Not on file       Family History:     Family History   Problem Relation Age of Onset   • Diabetes Father     • COPD Mother    • Hypertension Mother    • Cancer Maternal Aunt    • Diabetes Maternal Aunt    • Hypertension Maternal Grandmother    • COPD Maternal Grandfather    • Breast cancer Neg Hx        Review of Systems:   Review of Systems   Constitutional: Negative for activity change, appetite change, chills, fatigue, fever and unexpected weight change.   HENT: Negative.    Eyes: Negative.    Respiratory: Negative.    Cardiovascular: Negative.    Gastrointestinal: Negative.    Endocrine: Negative.    Genitourinary: Negative.    Musculoskeletal: Negative.    Skin: Negative.    Neurological: Positive for weakness and numbness.   Hematological: Negative.    Psychiatric/Behavioral: Negative.        Physical Exam:   Constitutional: Patient is oriented to person, place, and time. Patient appears well-developed and well-nourished. No acute distress.   Vitals:    06/10/19 0913   BP: 117/76   Pulse: 85       HENT:   Head: Normocephalic and atraumatic.   Right Ear: External ear normal.   Left Ear: External ear normal.   Eyes: EOM are normal. Right eye exhibits no discharge. Left eye exhibits no discharge.   Neck: Normal range of motion. Neck supple.   Cardiovascular: Regular rhythm and normal heart sounds.    No murmur heard.  Pulmonary/Chest: Effort normal. No respiratory distress.Clear to ascultation bilaterally.  Abdominal: Soft.   Musculoskeletal:Hands left hand shows a well-healed midline incision the base of her palm.  She has good range of motion of fingers without swelling.  Right hand shows no obvious swelling with good range of motion of fingers and wrist.  Negative Tinel sign.  Positive Phalen's test about 20 seconds.  Subjectively decreased sensation in median nerve distribution.        Neurological: Patient is alert and oriented to person, place, and time.   Skin: Skin is warm and dry. No rash noted. Patient is not diaphoretic.   Psychiatric: Patinet has a normal mood and affect. Patients behavior is normal.  Thought content normal.     Radiology:     EMG nerve conduction velocity test done last year were consistent with fairly significant carpal tunnel syndrome bilaterally        Assessment    ICD-10-CM ICD-9-CM   1. Right carpal tunnel syndrome G56.01 354.0       Plan:  Right carpal tunnel syndrome.  Has been recalcitrant conservative treatment.  Has previously undergone left carpal tunnel release with good results.  We discussed the risk and benefits including infection, bleeding, failure to relieve all her symptoms, local nerve damage, prolonged rehabilitation, recurrence etc.  Patient has done well with her left carpal tunnel release and would like to proceed with a right carpal tunnel release.  We will plan to do this as an outpatient on the 19th    Written by, Steffanie EDWARD, acting as a scribe for Dr. Aiken

## 2019-06-12 ENCOUNTER — TELEPHONE (OUTPATIENT)
Dept: ORTHOPEDIC SURGERY | Facility: CLINIC | Age: 39
End: 2019-06-12

## 2019-06-14 ENCOUNTER — APPOINTMENT (OUTPATIENT)
Dept: PREADMISSION TESTING | Facility: HOSPITAL | Age: 39
End: 2019-06-14

## 2019-06-14 LAB
ANION GAP SERPL CALCULATED.3IONS-SCNC: 14.9 MMOL/L
BUN BLD-MCNC: 8 MG/DL (ref 6–20)
BUN/CREAT SERPL: 11.1 (ref 7–25)
CALCIUM SPEC-SCNC: 9.4 MG/DL (ref 8.6–10.5)
CHLORIDE SERPL-SCNC: 102 MMOL/L (ref 98–107)
CO2 SERPL-SCNC: 22.1 MMOL/L (ref 22–29)
CREAT BLD-MCNC: 0.72 MG/DL (ref 0.57–1)
DEPRECATED RDW RBC AUTO: 42.4 FL (ref 37–54)
ERYTHROCYTE [DISTWIDTH] IN BLOOD BY AUTOMATED COUNT: 13.1 % (ref 12.3–15.4)
GFR SERPL CREATININE-BSD FRML MDRD: 91 ML/MIN/1.73
GLUCOSE BLD-MCNC: 79 MG/DL (ref 65–99)
HCT VFR BLD AUTO: 42.7 % (ref 34–46.6)
HGB BLD-MCNC: 14.9 G/DL (ref 12–15.9)
MCH RBC QN AUTO: 30.7 PG (ref 26.6–33)
MCHC RBC AUTO-ENTMCNC: 34.9 G/DL (ref 31.5–35.7)
MCV RBC AUTO: 88 FL (ref 79–97)
PLATELET # BLD AUTO: 269 10*3/MM3 (ref 140–450)
PMV BLD AUTO: 10.4 FL (ref 6–12)
POTASSIUM BLD-SCNC: 3.9 MMOL/L (ref 3.5–5.2)
RBC # BLD AUTO: 4.85 10*6/MM3 (ref 3.77–5.28)
SODIUM BLD-SCNC: 139 MMOL/L (ref 136–145)
WBC NRBC COR # BLD: 6.97 10*3/MM3 (ref 3.4–10.8)

## 2019-06-14 PROCEDURE — 80048 BASIC METABOLIC PNL TOTAL CA: CPT | Performed by: ANESTHESIOLOGY

## 2019-06-14 PROCEDURE — 36415 COLL VENOUS BLD VENIPUNCTURE: CPT

## 2019-06-14 PROCEDURE — 85027 COMPLETE CBC AUTOMATED: CPT | Performed by: ANESTHESIOLOGY

## 2019-06-14 NOTE — DISCHARGE INSTRUCTIONS
Procedure Date 6/19/19, Arrival Time 6:30AM    TAKE the following medications the morning of surgery:  All heart or blood pressure medications    HOLD all diabetic medications the morning of surgery as ordered by physician.    Please discontinue all blood thinners and anticoagulants (except aspirin) prior to surgery as per your surgeon and cardiologist instructions.  Aspirin may be continued up to the day prior to surgery.     CHLORHEXIDINE CLOTHS GIVEN WITH INSTRUCTIONS AND FORM TO RETURN TO HOSPITAL    General Instructions:  · Do not eat or drink after midnight 6/18/19: includes water, mints, or gum. You may brush your teeth.    · Dental appliances that are removable must be taken out day of surgery.  · Do not smoke, chew tobacco, or drink alcohol.  · Bring medications in original bottles, any inhalers and if applicable your C-PAP/BI-PAP machine.  · Bring any papers given to you in the doctor's office.  · Wear clean comfortable clothes and socks.  · Do not wear contact lenses or make-up. Bring a case for your glasses if applicable.  · Bring crutches or walker if applicable.  · Leave all other valuables and jewelry at home.    If you were given a blood bank ID arm band remember to bring it with you the day of surgery.    Preventing a Surgical Site Infection:  Shower the night before surgery (unless instructed other wise) using a fresh bar of anti-bacterial soap (such as Dial) and clean washcloth. Dry with a clean towel and dress in clean clothing.  For 2 to 3 days before surgery, avoid shaving with a razor near where you will have surgery because the razor can irritate skin and make it easier to develop an infection. Ask your surgeon if you will be receiving antibiotics prior to surgery.  Make sure you, your family, and all healthcare providers clear their hands with soap and water or an alcohol-based hand  before caring for you or your wound.  If at all possible, quit smoking as many days before surgery  as you can.    Day of surgery:  Upon arrival, a Pre-op nurse and Anesthesiologist will review your health history, obtain vital signs, and answer questions you may have. The only belongings needed at this time will be your home medications and if applicable your C-PAP/BI-PAP machine. If you are staying overnight your family can leave the rest of your belongings in the car and bring them to your room later. A Pre-op nurse will start an IV and you may receive medication in preparation for surgery, including something to help you relax. Your family will be able to see you in the Pre-op area. While you are in surgery your family should notify the waiting room  if they leave the waiting room area and provide a contact phone number.    Please be aware that surgery does come with discomfort. We want to make every effort to control your discomfort so please discuss any uncontrolled symptoms with your nurse. Your doctor will most likely have prescribed pain medications.    If you are going home after surgery you will receive individualized written care instructions before being discharged. A responsible adult must drive you to and from the hospital on the day of surgery and stay with you for 24 hours.    If you are staying overnight following surgery, you will be transported to your hospital room following the recovery period.  [facility] has all private rooms.    If you have any questions please call Pre-Admission Testing at [phone].  Deductibles and co-payments are collected on the day of service. Please be prepared to pay the required co-pay, deductible or deposit on the day of service as defined by your plan.

## 2019-06-19 ENCOUNTER — HOSPITAL ENCOUNTER (OUTPATIENT)
Facility: HOSPITAL | Age: 39
Setting detail: HOSPITAL OUTPATIENT SURGERY
Discharge: HOME | End: 2019-06-19
Attending: ORTHOPAEDIC SURGERY | Admitting: ORTHOPAEDIC SURGERY

## 2019-06-19 ENCOUNTER — ANESTHESIA (OUTPATIENT)
Dept: PERIOP | Facility: HOSPITAL | Age: 39
End: 2019-06-19

## 2019-06-19 ENCOUNTER — ANESTHESIA EVENT (OUTPATIENT)
Dept: PERIOP | Facility: HOSPITAL | Age: 39
End: 2019-06-19

## 2019-06-19 VITALS
WEIGHT: 153.6 LBS | HEIGHT: 71 IN | TEMPERATURE: 98 F | DIASTOLIC BLOOD PRESSURE: 56 MMHG | RESPIRATION RATE: 16 BRPM | SYSTOLIC BLOOD PRESSURE: 106 MMHG | HEART RATE: 82 BPM | OXYGEN SATURATION: 99 % | BODY MASS INDEX: 21.5 KG/M2

## 2019-06-19 DIAGNOSIS — G56.01 RIGHT CARPAL TUNNEL SYNDROME: ICD-10-CM

## 2019-06-19 PROCEDURE — 25010000002 PROPOFOL 10 MG/ML EMULSION: Performed by: NURSE ANESTHETIST, CERTIFIED REGISTERED

## 2019-06-19 PROCEDURE — 25010000003 CEFAZOLIN SODIUM-DEXTROSE 2-3 GM-%(50ML) RECONSTITUTED SOLUTION: Performed by: ORTHOPAEDIC SURGERY

## 2019-06-19 PROCEDURE — 25010000002 ONDANSETRON PER 1 MG: Performed by: NURSE ANESTHETIST, CERTIFIED REGISTERED

## 2019-06-19 PROCEDURE — 64721 CARPAL TUNNEL SURGERY: CPT | Performed by: ORTHOPAEDIC SURGERY

## 2019-06-19 PROCEDURE — 25010000002 PROPOFOL 1000 MG/ML EMULSION: Performed by: NURSE ANESTHETIST, CERTIFIED REGISTERED

## 2019-06-19 PROCEDURE — 25010000002 MIDAZOLAM PER 1 MG: Performed by: NURSE ANESTHETIST, CERTIFIED REGISTERED

## 2019-06-19 PROCEDURE — 25010000002 KETOROLAC TROMETHAMINE PER 15 MG: Performed by: NURSE ANESTHETIST, CERTIFIED REGISTERED

## 2019-06-19 PROCEDURE — 25010000002 FENTANYL CITRATE (PF) 100 MCG/2ML SOLUTION: Performed by: NURSE ANESTHETIST, CERTIFIED REGISTERED

## 2019-06-19 RX ORDER — IBUPROFEN 600 MG/1
600 TABLET ORAL EVERY 6 HOURS PRN
Qty: 20 TABLET | Refills: 2 | Status: SHIPPED | OUTPATIENT
Start: 2019-06-19

## 2019-06-19 RX ORDER — SODIUM CHLORIDE 0.9 % (FLUSH) 0.9 %
3 SYRINGE (ML) INJECTION EVERY 12 HOURS SCHEDULED
Status: DISCONTINUED | OUTPATIENT
Start: 2019-06-19 | End: 2019-06-19 | Stop reason: HOSPADM

## 2019-06-19 RX ORDER — HYDROCODONE BITARTRATE AND ACETAMINOPHEN 5; 325 MG/1; MG/1
1 TABLET ORAL ONCE AS NEEDED
Status: DISCONTINUED | OUTPATIENT
Start: 2019-06-19 | End: 2019-06-19 | Stop reason: HOSPADM

## 2019-06-19 RX ORDER — FENTANYL CITRATE 50 UG/ML
INJECTION, SOLUTION INTRAMUSCULAR; INTRAVENOUS AS NEEDED
Status: DISCONTINUED | OUTPATIENT
Start: 2019-06-19 | End: 2019-06-19 | Stop reason: SURG

## 2019-06-19 RX ORDER — FENTANYL CITRATE 50 UG/ML
50 INJECTION, SOLUTION INTRAMUSCULAR; INTRAVENOUS
Status: DISCONTINUED | OUTPATIENT
Start: 2019-06-19 | End: 2019-06-19 | Stop reason: HOSPADM

## 2019-06-19 RX ORDER — KETOROLAC TROMETHAMINE 30 MG/ML
INJECTION, SOLUTION INTRAMUSCULAR; INTRAVENOUS AS NEEDED
Status: DISCONTINUED | OUTPATIENT
Start: 2019-06-19 | End: 2019-06-19 | Stop reason: SURG

## 2019-06-19 RX ORDER — MEPERIDINE HYDROCHLORIDE 25 MG/ML
12.5 INJECTION INTRAMUSCULAR; INTRAVENOUS; SUBCUTANEOUS
Status: DISCONTINUED | OUTPATIENT
Start: 2019-06-19 | End: 2019-06-19 | Stop reason: HOSPADM

## 2019-06-19 RX ORDER — OXYCODONE HYDROCHLORIDE AND ACETAMINOPHEN 5; 325 MG/1; MG/1
1 TABLET ORAL ONCE AS NEEDED
Status: DISCONTINUED | OUTPATIENT
Start: 2019-06-19 | End: 2019-06-19 | Stop reason: HOSPADM

## 2019-06-19 RX ORDER — FAMOTIDINE 10 MG/ML
INJECTION, SOLUTION INTRAVENOUS AS NEEDED
Status: DISCONTINUED | OUTPATIENT
Start: 2019-06-19 | End: 2019-06-19 | Stop reason: SURG

## 2019-06-19 RX ORDER — SODIUM CHLORIDE, SODIUM LACTATE, POTASSIUM CHLORIDE, CALCIUM CHLORIDE 600; 310; 30; 20 MG/100ML; MG/100ML; MG/100ML; MG/100ML
125 INJECTION, SOLUTION INTRAVENOUS CONTINUOUS
Status: DISCONTINUED | OUTPATIENT
Start: 2019-06-19 | End: 2019-06-19 | Stop reason: HOSPADM

## 2019-06-19 RX ORDER — ONDANSETRON 2 MG/ML
4 INJECTION INTRAMUSCULAR; INTRAVENOUS ONCE AS NEEDED
Status: DISCONTINUED | OUTPATIENT
Start: 2019-06-19 | End: 2019-06-19 | Stop reason: HOSPADM

## 2019-06-19 RX ORDER — IPRATROPIUM BROMIDE AND ALBUTEROL SULFATE 2.5; .5 MG/3ML; MG/3ML
3 SOLUTION RESPIRATORY (INHALATION) ONCE AS NEEDED
Status: DISCONTINUED | OUTPATIENT
Start: 2019-06-19 | End: 2019-06-19 | Stop reason: HOSPADM

## 2019-06-19 RX ORDER — SODIUM CHLORIDE 0.9 % (FLUSH) 0.9 %
3-10 SYRINGE (ML) INJECTION AS NEEDED
Status: DISCONTINUED | OUTPATIENT
Start: 2019-06-19 | End: 2019-06-19 | Stop reason: HOSPADM

## 2019-06-19 RX ORDER — ONDANSETRON 2 MG/ML
INJECTION INTRAMUSCULAR; INTRAVENOUS AS NEEDED
Status: DISCONTINUED | OUTPATIENT
Start: 2019-06-19 | End: 2019-06-19 | Stop reason: SURG

## 2019-06-19 RX ORDER — CEFAZOLIN SODIUM 2 G/50ML
2 SOLUTION INTRAVENOUS ONCE
Status: COMPLETED | OUTPATIENT
Start: 2019-06-19 | End: 2019-06-19

## 2019-06-19 RX ORDER — BUPIVACAINE HYDROCHLORIDE AND EPINEPHRINE 5; 5 MG/ML; UG/ML
INJECTION, SOLUTION EPIDURAL; INTRACAUDAL; PERINEURAL AS NEEDED
Status: DISCONTINUED | OUTPATIENT
Start: 2019-06-19 | End: 2019-06-19 | Stop reason: HOSPADM

## 2019-06-19 RX ORDER — PROPOFOL 10 MG/ML
VIAL (ML) INTRAVENOUS AS NEEDED
Status: DISCONTINUED | OUTPATIENT
Start: 2019-06-19 | End: 2019-06-19 | Stop reason: SURG

## 2019-06-19 RX ORDER — HYDROCODONE BITARTRATE AND ACETAMINOPHEN 5; 325 MG/1; MG/1
1-2 TABLET ORAL EVERY 6 HOURS PRN
Qty: 14 TABLET | Refills: 0 | Status: SHIPPED | OUTPATIENT
Start: 2019-06-19

## 2019-06-19 RX ORDER — MIDAZOLAM HYDROCHLORIDE 1 MG/ML
INJECTION INTRAMUSCULAR; INTRAVENOUS AS NEEDED
Status: DISCONTINUED | OUTPATIENT
Start: 2019-06-19 | End: 2019-06-19 | Stop reason: SURG

## 2019-06-19 RX ADMIN — KETOROLAC TROMETHAMINE 30 MG: 30 INJECTION, SOLUTION INTRAMUSCULAR; INTRAVENOUS at 07:52

## 2019-06-19 RX ADMIN — MIDAZOLAM HYDROCHLORIDE 2 MG: 1 INJECTION, SOLUTION INTRAMUSCULAR; INTRAVENOUS at 07:42

## 2019-06-19 RX ADMIN — SODIUM CHLORIDE, POTASSIUM CHLORIDE, SODIUM LACTATE AND CALCIUM CHLORIDE 125 ML/HR: 600; 310; 30; 20 INJECTION, SOLUTION INTRAVENOUS at 07:15

## 2019-06-19 RX ADMIN — PROPOFOL 120 MCG/KG/MIN: 10 INJECTION, EMULSION INTRAVENOUS at 07:48

## 2019-06-19 RX ADMIN — FENTANYL CITRATE 100 MCG: 50 INJECTION INTRAMUSCULAR; INTRAVENOUS at 07:42

## 2019-06-19 RX ADMIN — ONDANSETRON 4 MG: 2 INJECTION, SOLUTION INTRAMUSCULAR; INTRAVENOUS at 07:42

## 2019-06-19 RX ADMIN — PROPOFOL 80 MG: 10 INJECTION, EMULSION INTRAVENOUS at 07:48

## 2019-06-19 RX ADMIN — FAMOTIDINE 20 MG: 10 INJECTION, SOLUTION INTRAVENOUS at 07:42

## 2019-06-19 RX ADMIN — CEFAZOLIN SODIUM 2 G: 2 SOLUTION INTRAVENOUS at 07:42

## 2019-06-19 NOTE — OP NOTE
CARPAL TUNNEL RELEASE  Procedure Report    Patient Name:  Keshia Jimenez  YOB: 1980    Date of Surgery:  6/19/2019     Indications: 39-year-old white female with signs and symptoms of right carpal tunnel syndrome.  Has been recalcitrant to conservative treatment.  EMG nerve conduction velocity tests are consistent with a diagnosis.  Had previously undergone left carpal tunnel release with good results.  The risk were discussed including infection bleeding failure to relieve all her symptoms local nerve damage need for future surgery recurrence etc. patient is aware of the risk and benefits and consented to the procedure    Pre-op Diagnosis:   Right carpal tunnel syndrome [G56.01]       Post-Op Diagnosis Codes:     * Right carpal tunnel syndrome [G56.01]    Procedure/CPT® Codes:      Procedure(s):  CARPAL TUNNEL RELEASE    Staff:  Surgeon(s):  Isaac Aiken MD    Assistant: Cody Kirkpatrick    Anesthesia: Choice    Estimated Blood Loss: minimal    Implants:    Nothing was implanted during the procedure    Specimen:          None        Findings: See op note    Complications: None    Description of Procedure: Briefly the patient brought the operating room she underwent IV sedation as per the anesthetic service.  A tourniquet was placed on stockinette on the right upper arm.  Right arm was prepped with ChloraPrep and she was draped in sterile fashion.  The incision site was infiltrated with 0.5% Marcaine with epi.  The arm was then exsanguinated with an Esmarch and tourniquet was inflated to 250 mmHg.  Total tourniquet time for this case was approximately 10 minutes.    Small longitudinal incision was made paralleling the thenar eminence.  Sharp dissection was made down through the accessory pollicis muscle to the transverse carpal ligament.  A small stab wound was made in transverse carpal ligament.  Then using a blunt hemostat to protect the nerve we divided the transverse carpal ligament  proximally and distally.  The nerve was identified a gentle epineural lysis was performed with a blunt hemostat.  The wound was irrigated out copiously.  The skin was closed with 3-0 nylon vertical mattress sutures a Betadine soaked Adaptic and sterile compressive dressing was placed over the wound along with a volar splint.  Patient tolerated procedure well transferred to recovery room in stable condition      Isaac Aiken MD     Date: 6/19/2019  Time: 8:11 AM

## 2019-06-19 NOTE — ANESTHESIA POSTPROCEDURE EVALUATION
Patient: Keshia Jimenez    Procedure Summary     Date:  06/19/19 Room / Location:  [facility] OR 03 /  [facility] OR    Anesthesia Start:  0742 Anesthesia Stop:  0816    Procedure:  CARPAL TUNNEL RELEASE (Right Wrist) Diagnosis:       Right carpal tunnel syndrome      (Right carpal tunnel syndrome [G56.01])    Surgeon:  Isaac Aiken MD Provider:  Cipriano Shah MD    Anesthesia Type:  general ASA Status:  2          Anesthesia Type: general  Last vitals  BP   98/56 (06/19/19 0702)   Temp   98 °F (36.7 °C) (06/19/19 0702)   Pulse   71 (06/19/19 0702)   Resp   18 (06/19/19 0702)     SpO2   96 % (06/19/19 0702)     Post Anesthesia Care and Evaluation    Patient location during evaluation: PHASE II  Patient participation: complete - patient participated  Level of consciousness: awake and alert  Pain score: 1  Pain management: adequate  Airway patency: patent  Anesthetic complications: No anesthetic complications  PONV Status: controlled  Cardiovascular status: acceptable  Respiratory status: acceptable  Hydration status: acceptable

## 2019-06-19 NOTE — ANESTHESIA PREPROCEDURE EVALUATION
Anesthesia Evaluation     Patient summary reviewed and Nursing notes reviewed   no history of anesthetic complications:  NPO Solid Status: > 8 hours  NPO Liquid Status: > 8 hours           Airway   Mallampati: II  TM distance: >3 FB  Neck ROM: full  no difficulty expected  Dental - normal exam     Pulmonary - normal exam   (+) a smoker Abstained day of surgery,   (-) asthma  Cardiovascular - negative cardio ROS and normal exam  Exercise tolerance: good (4-7 METS)    NYHA Classification: II    (-) hypertension, past MI, dysrhythmias, angina, CHF      Neuro/Psych  (+) numbness, psychiatric history ADHD and Anxiety,     (-) seizures  GI/Hepatic/Renal/Endo    (+)   hepatitis C, liver disease,     Musculoskeletal (-) negative ROS    Abdominal  - normal exam    Bowel sounds: normal.   Substance History - negative use     OB/GYN negative ob/gyn ROS         Other - negative ROS                       Anesthesia Plan    ASA 2     general     intravenous induction   Anesthetic plan, all risks, benefits, and alternatives have been provided, discussed and informed consent has been obtained with: patient.  Use of blood products discussed with patient  Consented to blood products.

## 2019-07-01 ENCOUNTER — OFFICE VISIT (OUTPATIENT)
Dept: ORTHOPEDIC SURGERY | Facility: CLINIC | Age: 39
End: 2019-07-01

## 2019-07-01 VITALS — BODY MASS INDEX: 21.42 KG/M2 | WEIGHT: 153 LBS | HEIGHT: 71 IN

## 2019-07-01 DIAGNOSIS — Z98.890 S/P CARPAL TUNNEL RELEASE: Primary | ICD-10-CM

## 2019-07-01 PROCEDURE — 99024 POSTOP FOLLOW-UP VISIT: CPT | Performed by: ORTHOPAEDIC SURGERY

## 2019-07-01 NOTE — PROGRESS NOTES
"Patient: Keshia Jimenez    YOB: 1980    Chief Complaint   Patient presents with   • Right Wrist - Follow-up, Post-op         History of Present Illness: Patient is 12 days status post right carpal tunnel release.  Doing well.  No major complaints.  Has actually gone back to work.  Says the numbness is feeling better    Past Medical History:   Diagnosis Date   • Anxiety    • Hepatitis C    • Irritable bowel syndrome (IBS)         Social History     Socioeconomic History   • Marital status:      Spouse name: Not on file   • Number of children: Not on file   • Years of education: Not on file   • Highest education level: Not on file   Tobacco Use   • Smoking status: Current Every Day Smoker     Packs/day: 1.00     Years: 25.00     Pack years: 25.00   • Smokeless tobacco: Never Used   Substance and Sexual Activity   • Alcohol use: No   • Drug use: No   • Sexual activity: Defer           Physical Exam: 39 y.o. female  General Appearance:    Alert and oriented x 3, cooperative, in no acute distress                   Vitals:    07/01/19 0824   Weight: 69.4 kg (153 lb)   Height: 180.3 cm (71\")          Wound is clean dry and intact.  Hand is grossly neurovascular intact with good range of motion and no swelling noted      Radiology:             Assessment/Plan: Doing well status post carpal tunnel release.  Stitches were removed today.  Still taking a little bit easy over the next few weeks but pain and swelling in the swelling be her guide activity.  We will check her back in 4 to 5 weeks to make sure she is doing okay.  She can slowly increase activity as tolerated            Discussion/Summary:                This chart was completed utilizing the dragon speech recognition software.  Grammatical errors, random word insertions, pronoun errors, and incomplete sentences or occasional consequences of the system due to software limitations, ambient noise, and hardware issues.  Any questions or concerns " about the content, text, or information contained within the body of this dictation should be directly addressed to the physician for clarification        This document was signed by Isaac Aiken M.D. July 1, 2019 8:28 AM

## 2024-10-10 ENCOUNTER — HOSPITAL ENCOUNTER (OUTPATIENT)
Dept: ULTRASOUND IMAGING | Facility: HOSPITAL | Age: 44
Discharge: HOME | End: 2024-10-10
Payer: COMMERCIAL

## 2024-10-10 ENCOUNTER — HOSPITAL ENCOUNTER (OUTPATIENT)
Dept: MAMMOGRAPHY | Facility: HOSPITAL | Age: 44
Discharge: HOME | End: 2024-10-10
Payer: COMMERCIAL

## 2024-10-10 DIAGNOSIS — N64.4 BREAST PAIN: ICD-10-CM

## 2024-10-10 PROCEDURE — G0279 TOMOSYNTHESIS, MAMMO: HCPCS

## 2024-10-10 PROCEDURE — 77066 DX MAMMO INCL CAD BI: CPT

## 2025-03-06 ENCOUNTER — HOSPITAL ENCOUNTER (EMERGENCY)
Facility: HOSPITAL | Age: 45
Discharge: PSYCHIATRIC OR SUBSTANCE USE FACILITY | DRG: 885 | End: 2025-03-06
Payer: COMMERCIAL

## 2025-03-06 ENCOUNTER — HOSPITAL ENCOUNTER (INPATIENT)
Facility: HOSPITAL | Age: 45
LOS: 4 days | Discharge: HOME | DRG: 885 | End: 2025-03-10
Attending: STUDENT IN AN ORGANIZED HEALTH CARE EDUCATION/TRAINING PROGRAM | Admitting: STUDENT IN AN ORGANIZED HEALTH CARE EDUCATION/TRAINING PROGRAM
Payer: COMMERCIAL

## 2025-03-06 VITALS
HEART RATE: 69 BPM | HEIGHT: 70 IN | WEIGHT: 165 LBS | SYSTOLIC BLOOD PRESSURE: 112 MMHG | RESPIRATION RATE: 18 BRPM | OXYGEN SATURATION: 100 % | TEMPERATURE: 98.2 F | DIASTOLIC BLOOD PRESSURE: 69 MMHG | BODY MASS INDEX: 23.62 KG/M2

## 2025-03-06 DIAGNOSIS — N30.00 ACUTE CYSTITIS WITHOUT HEMATURIA: Primary | ICD-10-CM

## 2025-03-06 DIAGNOSIS — R45.851 SUICIDAL IDEATIONS: ICD-10-CM

## 2025-03-06 LAB
ALBUMIN SERPL-MCNC: 4.3 G/DL (ref 3.5–5.2)
ALBUMIN/GLOB SERPL: 1.5 G/DL
ALP SERPL-CCNC: 43 U/L (ref 39–117)
ALT SERPL W P-5'-P-CCNC: 16 U/L (ref 1–33)
AMPHET+METHAMPHET UR QL: NEGATIVE
AMPHETAMINES UR QL: NEGATIVE
ANION GAP SERPL CALCULATED.3IONS-SCNC: 9.8 MMOL/L (ref 5–15)
AST SERPL-CCNC: 15 U/L (ref 1–32)
BACTERIA UR QL AUTO: ABNORMAL /HPF
BARBITURATES UR QL SCN: NEGATIVE
BASOPHILS # BLD AUTO: 0.05 10*3/MM3 (ref 0–0.2)
BASOPHILS NFR BLD AUTO: 0.7 % (ref 0–1.5)
BENZODIAZ UR QL SCN: NEGATIVE
BILIRUB SERPL-MCNC: 0.2 MG/DL (ref 0–1.2)
BILIRUB UR QL STRIP: NEGATIVE
BUN SERPL-MCNC: 9 MG/DL (ref 6–20)
BUN/CREAT SERPL: 12.7 (ref 7–25)
BUPRENORPHINE SERPL-MCNC: NEGATIVE NG/ML
CALCIUM SPEC-SCNC: 8.9 MG/DL (ref 8.6–10.5)
CANNABINOIDS SERPL QL: NEGATIVE
CHLORIDE SERPL-SCNC: 101 MMOL/L (ref 98–107)
CLARITY UR: ABNORMAL
CO2 SERPL-SCNC: 25.2 MMOL/L (ref 22–29)
COCAINE UR QL: NEGATIVE
COLOR UR: YELLOW
CREAT SERPL-MCNC: 0.71 MG/DL (ref 0.57–1)
DEPRECATED RDW RBC AUTO: 41.7 FL (ref 37–54)
EGFRCR SERPLBLD CKD-EPI 2021: 108.3 ML/MIN/1.73
EOSINOPHIL # BLD AUTO: 0.14 10*3/MM3 (ref 0–0.4)
EOSINOPHIL NFR BLD AUTO: 1.8 % (ref 0.3–6.2)
ERYTHROCYTE [DISTWIDTH] IN BLOOD BY AUTOMATED COUNT: 12.7 % (ref 12.3–15.4)
ETHANOL BLD-MCNC: <10 MG/DL (ref 0–10)
ETHANOL UR QL: <0.01 %
FENTANYL UR-MCNC: NEGATIVE NG/ML
GLOBULIN UR ELPH-MCNC: 2.8 GM/DL
GLUCOSE SERPL-MCNC: 99 MG/DL (ref 65–99)
GLUCOSE UR STRIP-MCNC: NEGATIVE MG/DL
HCT VFR BLD AUTO: 38.3 % (ref 34–46.6)
HGB BLD-MCNC: 12.8 G/DL (ref 12–15.9)
HGB UR QL STRIP.AUTO: NEGATIVE
HOLD SPECIMEN: NORMAL
HYALINE CASTS UR QL AUTO: ABNORMAL /LPF
IMM GRANULOCYTES # BLD AUTO: 0.02 10*3/MM3 (ref 0–0.05)
IMM GRANULOCYTES NFR BLD AUTO: 0.3 % (ref 0–0.5)
KETONES UR QL STRIP: NEGATIVE
LEUKOCYTE ESTERASE UR QL STRIP.AUTO: ABNORMAL
LYMPHOCYTES # BLD AUTO: 2.64 10*3/MM3 (ref 0.7–3.1)
LYMPHOCYTES NFR BLD AUTO: 34.6 % (ref 19.6–45.3)
MAGNESIUM SERPL-MCNC: 2 MG/DL (ref 1.6–2.6)
MCH RBC QN AUTO: 30.2 PG (ref 26.6–33)
MCHC RBC AUTO-ENTMCNC: 33.4 G/DL (ref 31.5–35.7)
MCV RBC AUTO: 90.3 FL (ref 79–97)
METHADONE UR QL SCN: NEGATIVE
MONOCYTES # BLD AUTO: 0.59 10*3/MM3 (ref 0.1–0.9)
MONOCYTES NFR BLD AUTO: 7.7 % (ref 5–12)
NEUTROPHILS NFR BLD AUTO: 4.18 10*3/MM3 (ref 1.7–7)
NEUTROPHILS NFR BLD AUTO: 54.9 % (ref 42.7–76)
NITRITE UR QL STRIP: NEGATIVE
NRBC BLD AUTO-RTO: 0 /100 WBC (ref 0–0.2)
OPIATES UR QL: POSITIVE
OXYCODONE UR QL SCN: NEGATIVE
PCP UR QL SCN: NEGATIVE
PH UR STRIP.AUTO: 6.5 [PH] (ref 5–8)
PLATELET # BLD AUTO: 298 10*3/MM3 (ref 140–450)
PMV BLD AUTO: 8.8 FL (ref 6–12)
POTASSIUM SERPL-SCNC: 4 MMOL/L (ref 3.5–5.2)
PROT SERPL-MCNC: 7.1 G/DL (ref 6–8.5)
PROT UR QL STRIP: NEGATIVE
RBC # BLD AUTO: 4.24 10*6/MM3 (ref 3.77–5.28)
RBC # UR STRIP: ABNORMAL /HPF
REF LAB TEST METHOD: ABNORMAL
SODIUM SERPL-SCNC: 136 MMOL/L (ref 136–145)
SP GR UR STRIP: <=1.005 (ref 1–1.03)
SQUAMOUS #/AREA URNS HPF: ABNORMAL /HPF
TRICYCLICS UR QL SCN: NEGATIVE
UROBILINOGEN UR QL STRIP: ABNORMAL
WBC # UR STRIP: ABNORMAL /HPF
WBC NRBC COR # BLD AUTO: 7.62 10*3/MM3 (ref 3.4–10.8)

## 2025-03-06 PROCEDURE — 81001 URINALYSIS AUTO W/SCOPE: CPT

## 2025-03-06 PROCEDURE — 82077 ASSAY SPEC XCP UR&BREATH IA: CPT

## 2025-03-06 PROCEDURE — 36415 COLL VENOUS BLD VENIPUNCTURE: CPT

## 2025-03-06 PROCEDURE — 99285 EMERGENCY DEPT VISIT HI MDM: CPT

## 2025-03-06 PROCEDURE — 85025 COMPLETE CBC W/AUTO DIFF WBC: CPT

## 2025-03-06 PROCEDURE — 93005 ELECTROCARDIOGRAM TRACING: CPT

## 2025-03-06 PROCEDURE — 93010 ELECTROCARDIOGRAM REPORT: CPT | Performed by: INTERNAL MEDICINE

## 2025-03-06 PROCEDURE — 80053 COMPREHEN METABOLIC PANEL: CPT

## 2025-03-06 PROCEDURE — 80307 DRUG TEST PRSMV CHEM ANLYZR: CPT

## 2025-03-06 PROCEDURE — 83735 ASSAY OF MAGNESIUM: CPT

## 2025-03-06 RX ORDER — LOPERAMIDE HYDROCHLORIDE 2 MG/1
2 CAPSULE ORAL
Status: DISCONTINUED | OUTPATIENT
Start: 2025-03-06 | End: 2025-03-10 | Stop reason: HOSPADM

## 2025-03-06 RX ORDER — HYDROXYZINE HYDROCHLORIDE 50 MG/1
50 TABLET, FILM COATED ORAL EVERY 6 HOURS PRN
Status: DISCONTINUED | OUTPATIENT
Start: 2025-03-06 | End: 2025-03-10 | Stop reason: HOSPADM

## 2025-03-06 RX ORDER — ECHINACEA PURPUREA EXTRACT 125 MG
2 TABLET ORAL AS NEEDED
Status: DISCONTINUED | OUTPATIENT
Start: 2025-03-06 | End: 2025-03-10 | Stop reason: HOSPADM

## 2025-03-06 RX ORDER — BENZTROPINE MESYLATE 1 MG/1
2 TABLET ORAL ONCE AS NEEDED
Status: DISCONTINUED | OUTPATIENT
Start: 2025-03-06 | End: 2025-03-10 | Stop reason: HOSPADM

## 2025-03-06 RX ORDER — POLYETHYLENE GLYCOL 3350 17 G/17G
17 POWDER, FOR SOLUTION ORAL DAILY PRN
Status: DISCONTINUED | OUTPATIENT
Start: 2025-03-06 | End: 2025-03-10 | Stop reason: HOSPADM

## 2025-03-06 RX ORDER — BENZTROPINE MESYLATE 1 MG/ML
1 INJECTION, SOLUTION INTRAMUSCULAR; INTRAVENOUS ONCE AS NEEDED
Status: DISCONTINUED | OUTPATIENT
Start: 2025-03-06 | End: 2025-03-10 | Stop reason: HOSPADM

## 2025-03-06 RX ORDER — CEFDINIR 300 MG/1
300 CAPSULE ORAL ONCE
Status: COMPLETED | OUTPATIENT
Start: 2025-03-06 | End: 2025-03-06

## 2025-03-06 RX ORDER — IBUPROFEN 400 MG/1
400 TABLET, FILM COATED ORAL EVERY 6 HOURS PRN
Status: DISCONTINUED | OUTPATIENT
Start: 2025-03-06 | End: 2025-03-10 | Stop reason: HOSPADM

## 2025-03-06 RX ORDER — NICOTINE 21 MG/24HR
1 PATCH, TRANSDERMAL 24 HOURS TRANSDERMAL
Status: DISCONTINUED | OUTPATIENT
Start: 2025-03-07 | End: 2025-03-10 | Stop reason: HOSPADM

## 2025-03-06 RX ORDER — ATOMOXETINE 40 MG/1
40 CAPSULE ORAL DAILY
COMMUNITY

## 2025-03-06 RX ORDER — FAMOTIDINE 20 MG/1
20 TABLET, FILM COATED ORAL 2 TIMES DAILY PRN
Status: DISCONTINUED | OUTPATIENT
Start: 2025-03-06 | End: 2025-03-10 | Stop reason: HOSPADM

## 2025-03-06 RX ORDER — ALUMINA, MAGNESIA, AND SIMETHICONE 2400; 2400; 240 MG/30ML; MG/30ML; MG/30ML
15 SUSPENSION ORAL EVERY 6 HOURS PRN
Status: DISCONTINUED | OUTPATIENT
Start: 2025-03-06 | End: 2025-03-10 | Stop reason: HOSPADM

## 2025-03-06 RX ORDER — CEFDINIR 300 MG/1
300 CAPSULE ORAL EVERY 12 HOURS SCHEDULED
Status: DISCONTINUED | OUTPATIENT
Start: 2025-03-07 | End: 2025-03-10 | Stop reason: HOSPADM

## 2025-03-06 RX ORDER — ONDANSETRON 4 MG/1
4 TABLET, ORALLY DISINTEGRATING ORAL EVERY 6 HOURS PRN
Status: DISCONTINUED | OUTPATIENT
Start: 2025-03-06 | End: 2025-03-10 | Stop reason: HOSPADM

## 2025-03-06 RX ORDER — ACETAMINOPHEN 325 MG/1
650 TABLET ORAL EVERY 6 HOURS PRN
Status: DISCONTINUED | OUTPATIENT
Start: 2025-03-06 | End: 2025-03-10 | Stop reason: HOSPADM

## 2025-03-06 RX ORDER — BENZONATATE 100 MG/1
100 CAPSULE ORAL 3 TIMES DAILY PRN
Status: DISCONTINUED | OUTPATIENT
Start: 2025-03-06 | End: 2025-03-10 | Stop reason: HOSPADM

## 2025-03-06 RX ORDER — GABAPENTIN 300 MG/1
300 CAPSULE ORAL 2 TIMES DAILY PRN
COMMUNITY
End: 2025-03-10 | Stop reason: HOSPADM

## 2025-03-06 RX ORDER — TRAZODONE HYDROCHLORIDE 50 MG/1
50 TABLET ORAL NIGHTLY PRN
Status: DISCONTINUED | OUTPATIENT
Start: 2025-03-06 | End: 2025-03-10 | Stop reason: HOSPADM

## 2025-03-06 RX ORDER — ATOMOXETINE 40 MG/1
40 CAPSULE ORAL DAILY
Status: DISCONTINUED | OUTPATIENT
Start: 2025-03-07 | End: 2025-03-10 | Stop reason: HOSPADM

## 2025-03-06 RX ORDER — GABAPENTIN 300 MG/1
300 CAPSULE ORAL 2 TIMES DAILY PRN
Status: CANCELLED | OUTPATIENT
Start: 2025-03-06

## 2025-03-06 RX ADMIN — CEFDINIR 300 MG: 300 CAPSULE ORAL at 22:36

## 2025-03-07 PROBLEM — F33.2 MDD (MAJOR DEPRESSIVE DISORDER), RECURRENT SEVERE, WITHOUT PSYCHOSIS: Status: ACTIVE | Noted: 2025-03-07

## 2025-03-07 LAB
HAV IGM SERPL QL IA: ABNORMAL
HBV CORE IGM SERPL QL IA: ABNORMAL
HBV SURFACE AG SERPL QL IA: ABNORMAL
HCV AB SER QL: REACTIVE
QT INTERVAL: 382 MS
QTC INTERVAL: 424 MS

## 2025-03-07 PROCEDURE — 80074 ACUTE HEPATITIS PANEL: CPT | Performed by: STUDENT IN AN ORGANIZED HEALTH CARE EDUCATION/TRAINING PROGRAM

## 2025-03-07 PROCEDURE — 99223 1ST HOSP IP/OBS HIGH 75: CPT | Performed by: PSYCHIATRY & NEUROLOGY

## 2025-03-07 RX ADMIN — ACETAMINOPHEN 650 MG: 325 TABLET, FILM COATED ORAL at 20:48

## 2025-03-07 RX ADMIN — CEFDINIR 300 MG: 300 CAPSULE ORAL at 20:48

## 2025-03-07 RX ADMIN — LOPERAMIDE HYDROCHLORIDE 2 MG: 2 CAPSULE ORAL at 20:48

## 2025-03-07 RX ADMIN — CEFDINIR 300 MG: 300 CAPSULE ORAL at 08:57

## 2025-03-07 RX ADMIN — NICOTINE 1 PATCH: 21 PATCH TRANSDERMAL at 08:57

## 2025-03-07 RX ADMIN — ATOMOXETINE 40 MG: 40 CAPSULE ORAL at 08:57

## 2025-03-07 NOTE — PLAN OF CARE
"Goal Outcome Evaluation:  Plan of Care Reviewed With: patient  Plan of Care Reviewed With: patient  Patient Agreement with Plan of Care: agrees     Progress: no change  Outcome Evaluation: Patient came to the ED for suicidal thoughts to overdose on Fentanyl. \"Having suicidal thoughts for 2 months and been worse the past couple of days.\" It all started in 2021, my step mom committed suicide. Then in 2022 I went through a separation and quit my job. Then last year my grandmother passed and now Im having to care for my 19 year old autistic cousin. Patient rates anxiety and depression 10/10. Denies HI/AVH. Denies drug or alcohol abuse. Appetite-good. Sleep-has been excessive.  Labs: UDS+ for opiates, Pt does report taking a prescribed pain pill for her back. UA- WBC 6-10, 2+ bacteria                             "

## 2025-03-07 NOTE — NURSING NOTE
Spoke to Dr. Mcpherson via phone. Intake information, labs, and vital sign provided. Instructed to admit with routine orders. [id number]

## 2025-03-07 NOTE — PLAN OF CARE
Goal Outcome Evaluation:  Plan of Care Reviewed With: patient  Plan of Care Reviewed With: patient  Patient Agreement with Plan of Care: agrees           Patient cooperative, interacting with staff and peer. Patient denies suicidal or homicidal ideation

## 2025-03-07 NOTE — H&P
INITIAL PSYCHIATRIC HISTORY & PHYSICAL    Patient Identification:  Name:  Keshia Jimenez  Age:  44 y.o.  Sex:  female  :  1980  MRN:  [id number]   Visit Number:  [id number]  Primary Care Physician:  Steffanie Chavez MD    SUBJECTIVE    CC/Focus of Exam: Suicidal ideations    HPI: Keshia Jimenez is a 44 y.o. female who was admitted on 3/6/2025 with complaints of suicidal ideations. She states she has been feeling suicidal for the last three months and it got worse last four weeks. She states she has thought about taking an overdose of fentanyl. She states she has been feeling depressed since 2021 when her step-mother committed suicide. She took a bunch of pills and drank tequila. Patient states she was close to her step-mother and has had a hard time dealing with it. More recent stressors include patient went through a separation and  2023 but still lived together and moved out 2024 and in September her grandmother passed away, and patient has to now take care of a 19 y.o. autistic cousin that grandmother was taking care of and it has been very difficult.   Associated symptoms include excessive sleep, anhedonia, feelings of hopelessness, no energy, difficulty with memory and concentration, and having suicidal ideations. No  radha, hypomania or hallucinations.     PAST PSYCHIATRIC HX: The patient reports she was admitted to inpatient psych unit is Oct 2022 in [facility] for depression. She is currently in outpatient treatment for depression. She has also been in treatment for ADHD.     SUBSTANCE USE HX: None reported at this time. She reports a history of opioid use disorder starting at age 15 and use IV heroin and overdosed in  and has been sober since .     SOCIAL HX:   Social History     Socioeconomic History    Marital status:    Tobacco Use    Smoking status: Former     Current packs/day: 1.00     Average packs/day: 1 pack/day for 25.0 years (25.0 ttl  pk-yrs)     Types: Cigarettes    Smokeless tobacco: Never   Vaping Use    Vaping status: Every Day    Substances: Nicotine   Substance and Sexual Activity    Alcohol use: No    Drug use: No    Sexual activity: Defer         Past Medical History:   Diagnosis Date    ADHD     Anxiety     Depression     Hepatitis C     Irritable bowel syndrome (IBS)     OCD (obsessive compulsive disorder)           Past Surgical History:   Procedure Laterality Date    CARPAL TUNNEL RELEASE Left 07/25/2018    Procedure: CARPAL TUNNEL RELEASE;  Surgeon: Isaac Aiken MD;  Location: [facility] OR;  Service: Orthopedics    CARPAL TUNNEL RELEASE Right 06/19/2019    Procedure: CARPAL TUNNEL RELEASE;  Surgeon: Isaac Aiken MD;  Location: [facility] OR;  Service: Orthopedics    COLONOSCOPY      ENDOMETRIAL ABLATION      HYSTERECTOMY  2008    benign    TONSILLECTOMY         Family History   Problem Relation Age of Onset    Diabetes Father     COPD Mother     Hypertension Mother     Cancer Maternal Aunt     Diabetes Maternal Aunt     Hypertension Maternal Grandmother     COPD Maternal Grandfather     Breast cancer Neg Hx          Medications Prior to Admission   Medication Sig Dispense Refill Last Dose/Taking    atomoxetine (STRATTERA) 40 MG capsule Take 1 capsule by mouth Daily.   3/6/2025 Morning    gabapentin (NEURONTIN) 300 MG capsule Take 1 capsule by mouth 2 (Two) Times a Day As Needed (Hot Flashes).   Unknown         ALLERGIES:  Patient has no known allergies.    Temp:  [97.9 °F (36.6 °C)-98.7 °F (37.1 °C)] 98.7 °F (37.1 °C)  Heart Rate:  [66-85] 70  Resp:  [16-18] 18  BP: (111-118)/(62-71) 111/62    REVIEW OF SYSTEMS:  Review of Systems   Constitutional: Negative.    HENT: Negative.     Eyes: Negative.    Respiratory: Negative.     Cardiovascular: Negative.    Gastrointestinal: Negative.    Endocrine: Negative.    Genitourinary: Negative.    Musculoskeletal: Negative.    Skin: Negative.    Allergic/Immunologic:  Negative.    Neurological: Negative.    Hematological: Negative.    Psychiatric/Behavioral:  Positive for dysphoric mood and suicidal ideas. The patient is nervous/anxious.         OBJECTIVE    PHYSICAL EXAM:  Physical Exam  Constitutional:  Appears well-developed and well-nourished.   HENT:   Head: Normocephalic and atraumatic.   Right Ear: External ear normal.   Left Ear: External ear normal.   Mouth/Throat: Oropharynx is clear and moist.   Eyes: Pupils are equal, round, and reactive to light. Conjunctivae and EOM are normal.   Neck: Normal range of motion. Neck supple.   Cardiovascular: Normal rate, regular rhythm and normal heart sounds.    Respiratory: Effort normal and breath sounds normal. No respiratory distress. No wheezes.   GI: Soft. Bowel sounds are normal.No distension. There is no tenderness.   Musculoskeletal: Normal range of motion. No edema or deformity.   Neurological:  Cranial Nerves: I. No anosmia. II: No visual disturbance. III, IV VI: EOMI, PERRLA. V: Corneal reflext intact, no abnormal sensations. VII: No facial palsy, or altered sensation. VIII: Hearing intact, balance intact. IX: Intact ah reflex. X: Normal phonation, swallowing. XI: Normal shrug and head movement. XII: Intact tongue movements  Coordination normal. No lateralizing signs.  Skin: Skin is warm and dry. No rash noted. No erythema.     MENTAL STATUS EXAM:   Hygiene:   fair  Cooperation:  Cooperative  Eye Contact:  Fair  Psychomotor Behavior:  Appropriate  Affect:  Restricted  Hopelessness: 5  Speech:  Normal  Thought Progress: Goal directed and Linear  Thought Content:  Normal  Suicidal:  Suicidal Ideation  Homicidal:  None  Hallucinations:  None  Delusion:  None  Memory:  Intact  Orientation:  Person, Place, Time, and Situation  Reliability:  fair  Insight:  Fair  Judgement:  Fair  Impulse Control:  Fair    Imaging Results (Last 24 Hours)       ** No results found for the last 24 hours. **             ECG/EMG Results (most  recent)       None             Lab Results   Component Value Date    GLUCOSE 99 03/06/2025    BUN 9 03/06/2025    CREATININE 0.71 03/06/2025    EGFRIFNONA 91 06/14/2019    BCR 12.7 03/06/2025    CO2 25.2 03/06/2025    CALCIUM 8.9 03/06/2025    ALBUMIN 4.3 03/06/2025    AST 15 03/06/2025    ALT 16 03/06/2025       Lab Results   Component Value Date    WBC 7.62 03/06/2025    HGB 12.8 03/06/2025    HCT 38.3 03/06/2025    MCV 90.3 03/06/2025     03/06/2025       Last Urine Toxicity          Latest Ref Rng & Units 3/6/2025   LAST URINE TOXICITY RESULTS   Amphetamine, Urine Qual Negative Negative    Barbiturates Screen, Urine Negative Negative    Benzodiazepine Screen, Urine Negative Negative    Buprenorphine, Screen, Urine Negative Negative    Cocaine Screen, Urine Negative Negative    Fentanyl, Urine Negative Negative    Methadone Screen , Urine Negative Negative    Methamphetamine, Ur Negative Negative       Details                   Brief Urine Lab Results  (Last result in the past 365 days)        Color   Clarity   Blood   Leuk Est   Nitrite   Protein   CREAT   Urine HCG        03/06/25 2012 Yellow   Cloudy   Negative   Moderate (2+)   Negative   Negative                   DATA  Labs reviewed. Hep C reactive. UA shows cloudy appearance, moderate leukocyte esterase, 6-10 WBC, 2+ bacteria, 3-6 sq epi cells. UDS positive for opiate.   EKG reviewed. QTc 424 ms  AYAN reviewed.   Record reviewed. No previous treatment noted in this hospital for mental health or substance use problems.       Strengths: Motivated for treatment    Weaknesses:Substance use and Poor coping skills    Code status:  Full  Discussed code status with patient.    ASSESSMENT & PLAN:    Hospital bed: No      Suicidal ideations  -SP 3      MDD (major depressive disorder), recurrent severe, without psychosis  -Start escitalopram 10 mg daily      ADHD  -Strattera      UTI  -Omnicef      Nicotine use disorder  -Nicotine replacement      The  patient has been admitted for safety and stabilization.  Patient will be monitored for suicidality daily and maintained on Special Precautions Level 3 (q15 min checks) .  The patient will have individual and group therapy with a master's level therapist. A master treatment plan will be developed and agreed upon by the patient and his/her treatment team.  The patient's estimated length of stay in the hospital is 5-7 days.

## 2025-03-07 NOTE — NURSING NOTE
" Intake assessment completed at this time. Pt presents to Intake stating \"Hallie been really depressed and had suicidal thoughts for a while now. I bought some fentanyl to OD on a couple months ago but I ended up throwing it away. It all started in 2021, my step mom commited suicide. Then in 2022 i went throught a separation and quit my job. Then last year my grandmother passed and now Im having to care for my 19 year old autistic cousin. I made some comments today to my coworkers about this all and they wanted me to come here. I just cant take everything thats been happening.\" Pt reports one previous psych admission elsewhere and currently sees a therapist where she works. Pt denies any substance use.     Labs: UDS+ for opiates, Pt does report taking a prescribed pain pill for her back.  UA- WBC 6-10, 2+ bacteria     Anxiety 10 depression 10 on scale of 0-10. Sleep excessive and  appetite good.    Pt denies any HI/AVH     Pt A&Ox4  "

## 2025-03-07 NOTE — NURSING NOTE
Called and spoke to Amaris EDWARD about UA results. Instructed to start pt on Cefdinir 300 mg PO twice daily for 1 week.

## 2025-03-07 NOTE — ED PROVIDER NOTES
Subjective   History of Present Illness  This is a 44 year old female patient who presents to the ER with chief complaint of suicidal ideations. Patient endorses dealing with depression for awhile now. Today, she brought fentanyl with a plan to end her life. Denies HI, alcohol abuse.       Review of Systems   Constitutional: Negative.  Negative for fever.   HENT: Negative.     Respiratory: Negative.     Cardiovascular: Negative.  Negative for chest pain.   Gastrointestinal: Negative.  Negative for abdominal pain.   Endocrine: Negative.    Genitourinary: Negative.  Negative for dysuria.   Skin: Negative.    Neurological: Negative.    Psychiatric/Behavioral:  Positive for suicidal ideas. Negative for agitation, behavioral problems, confusion, decreased concentration, dysphoric mood, hallucinations, self-injury and sleep disturbance. The patient is not nervous/anxious and is not hyperactive.    All other systems reviewed and are negative.      Past Medical History:   Diagnosis Date    ADHD     Anxiety     Depression     Hepatitis C     Irritable bowel syndrome (IBS)     OCD (obsessive compulsive disorder)        No Known Allergies    Past Surgical History:   Procedure Laterality Date    CARPAL TUNNEL RELEASE Left 07/25/2018    Procedure: CARPAL TUNNEL RELEASE;  Surgeon: Isaac Aiken MD;  Location: [facility] OR;  Service: Orthopedics    CARPAL TUNNEL RELEASE Right 06/19/2019    Procedure: CARPAL TUNNEL RELEASE;  Surgeon: Isaac Aiken MD;  Location: [facility] OR;  Service: Orthopedics    COLONOSCOPY      ENDOMETRIAL ABLATION      HYSTERECTOMY  2008    benign    TONSILLECTOMY         Family History   Problem Relation Age of Onset    Diabetes Father     COPD Mother     Hypertension Mother     Cancer Maternal Aunt     Diabetes Maternal Aunt     Hypertension Maternal Grandmother     COPD Maternal Grandfather     Breast cancer Neg Hx        Social History     Socioeconomic History    Marital status:     Tobacco Use    Smoking status: Former     Current packs/day: 1.00     Average packs/day: 1 pack/day for 25.0 years (25.0 ttl pk-yrs)     Types: Cigarettes    Smokeless tobacco: Never   Vaping Use    Vaping status: Every Day    Substances: Nicotine   Substance and Sexual Activity    Alcohol use: No    Drug use: No    Sexual activity: Defer           Objective   Physical Exam  Vitals and nursing note reviewed.   Constitutional:       General: She is not in acute distress.     Appearance: She is well-developed. She is not diaphoretic.   HENT:      Head: Normocephalic and atraumatic.      Right Ear: External ear normal.      Left Ear: External ear normal.      Nose: Nose normal.   Eyes:      Conjunctiva/sclera: Conjunctivae normal.      Pupils: Pupils are equal, round, and reactive to light.   Neck:      Vascular: No JVD.      Trachea: No tracheal deviation.   Cardiovascular:      Rate and Rhythm: Normal rate and regular rhythm.      Heart sounds: Normal heart sounds. No murmur heard.  Pulmonary:      Effort: Pulmonary effort is normal. No respiratory distress.      Breath sounds: Normal breath sounds. No wheezing.   Abdominal:      General: Bowel sounds are normal.      Palpations: Abdomen is soft.      Tenderness: There is no abdominal tenderness.   Musculoskeletal:         General: No deformity. Normal range of motion.      Cervical back: Normal range of motion and neck supple.   Skin:     General: Skin is warm and dry.      Coloration: Skin is not pale.      Findings: No erythema or rash.   Neurological:      Mental Status: She is alert and oriented to person, place, and time.      Cranial Nerves: No cranial nerve deficit.   Psychiatric:         Behavior: Behavior normal.         Thought Content: Thought content is not paranoid or delusional. Thought content includes suicidal ideation. Thought content does not include homicidal ideation. Thought content includes suicidal plan. Thought content does not  include homicidal plan.         Procedures       Results for orders placed or performed during the hospital encounter of 03/06/25   Comprehensive Metabolic Panel    Collection Time: 03/06/25  7:59 PM    Specimen: Blood   Result Value Ref Range    Glucose 99 65 - 99 mg/dL    BUN 9 6 - 20 mg/dL    Creatinine 0.71 0.57 - 1.00 mg/dL    Sodium 136 136 - 145 mmol/L    Potassium 4.0 3.5 - 5.2 mmol/L    Chloride 101 98 - 107 mmol/L    CO2 25.2 22.0 - 29.0 mmol/L    Calcium 8.9 8.6 - 10.5 mg/dL    Total Protein 7.1 6.0 - 8.5 g/dL    Albumin 4.3 3.5 - 5.2 g/dL    ALT (SGPT) 16 1 - 33 U/L    AST (SGOT) 15 1 - 32 U/L    Alkaline Phosphatase 43 39 - 117 U/L    Total Bilirubin 0.2 0.0 - 1.2 mg/dL    Globulin 2.8 gm/dL    A/G Ratio 1.5 g/dL    BUN/Creatinine Ratio 12.7 7.0 - 25.0    Anion Gap 9.8 5.0 - 15.0 mmol/L    eGFR 108.3 >60.0 mL/min/1.73   Ethanol    Collection Time: 03/06/25  7:59 PM    Specimen: Blood   Result Value Ref Range    Ethanol <10 0 - 10 mg/dL    Ethanol % <0.010 %   Magnesium    Collection Time: 03/06/25  7:59 PM    Specimen: Blood   Result Value Ref Range    Magnesium 2.0 1.6 - 2.6 mg/dL   CBC Auto Differential    Collection Time: 03/06/25  7:59 PM    Specimen: Blood   Result Value Ref Range    WBC 7.62 3.40 - 10.80 10*3/mm3    RBC 4.24 3.77 - 5.28 10*6/mm3    Hemoglobin 12.8 12.0 - 15.9 g/dL    Hematocrit 38.3 34.0 - 46.6 %    MCV 90.3 79.0 - 97.0 fL    MCH 30.2 26.6 - 33.0 pg    MCHC 33.4 31.5 - 35.7 g/dL    RDW 12.7 12.3 - 15.4 %    RDW-SD 41.7 37.0 - 54.0 fl    MPV 8.8 6.0 - 12.0 fL    Platelets 298 140 - 450 10*3/mm3    Neutrophil % 54.9 42.7 - 76.0 %    Lymphocyte % 34.6 19.6 - 45.3 %    Monocyte % 7.7 5.0 - 12.0 %    Eosinophil % 1.8 0.3 - 6.2 %    Basophil % 0.7 0.0 - 1.5 %    Immature Grans % 0.3 0.0 - 0.5 %    Neutrophils, Absolute 4.18 1.70 - 7.00 10*3/mm3    Lymphocytes, Absolute 2.64 0.70 - 3.10 10*3/mm3    Monocytes, Absolute 0.59 0.10 - 0.90 10*3/mm3    Eosinophils, Absolute 0.14 0.00 - 0.40  10*3/mm3    Basophils, Absolute 0.05 0.00 - 0.20 10*3/mm3    Immature Grans, Absolute 0.02 0.00 - 0.05 10*3/mm3    nRBC 0.0 0.0 - 0.2 /100 WBC   Urinalysis With Microscopic If Indicated (No Culture) - Urine, Clean Catch    Collection Time: 03/06/25  8:12 PM    Specimen: Urine, Clean Catch   Result Value Ref Range    Color, UA Yellow Yellow, Straw    Appearance, UA Cloudy (A) Clear    pH, UA 6.5 5.0 - 8.0    Specific Gravity, UA <=1.005 1.005 - 1.030    Glucose, UA Negative Negative    Ketones, UA Negative Negative    Bilirubin, UA Negative Negative    Blood, UA Negative Negative    Protein, UA Negative Negative    Leuk Esterase, UA Moderate (2+) (A) Negative    Nitrite, UA Negative Negative    Urobilinogen, UA 0.2 E.U./dL 0.2 - 1.0 E.U./dL   Urine Drug Screen - Urine, Clean Catch    Collection Time: 03/06/25  8:12 PM    Specimen: Urine, Clean Catch   Result Value Ref Range    THC, Screen, Urine Negative Negative    Phencyclidine (PCP), Urine Negative Negative    Cocaine Screen, Urine Negative Negative    Methamphetamine, Ur Negative Negative    Opiate Screen Positive (A) Negative    Amphetamine Screen, Urine Negative Negative    Benzodiazepine Screen, Urine Negative Negative    Tricyclic Antidepressants Screen Negative Negative    Methadone Screen, Urine Negative Negative    Barbiturates Screen, Urine Negative Negative    Oxycodone Screen, Urine Negative Negative    Buprenorphine, Screen, Urine Negative Negative   Fentanyl, Urine - Urine, Clean Catch    Collection Time: 03/06/25  8:12 PM    Specimen: Urine, Clean Catch   Result Value Ref Range    Fentanyl, Urine Negative Negative   Urinalysis, Microscopic Only - Urine, Clean Catch    Collection Time: 03/06/25  8:12 PM    Specimen: Urine, Clean Catch   Result Value Ref Range    RBC, UA 0-2 None Seen, 0-2 /HPF    WBC, UA 6-10 (A) None Seen, 0-2 /HPF    Bacteria, UA 2+ (A) None Seen /HPF    Squamous Epithelial Cells, UA 3-6 (A) None Seen, 0-2 /HPF    Hyaline Casts, UA  None Seen None Seen /LPF    Methodology Manual Light Microscopy    [place] Urine Culture Tube - Urine, Clean Catch    Collection Time: 03/06/25  8:12 PM    Specimen: Urine, Clean Catch   Result Value Ref Range    Extra Tube Hold for add-ons.           ED Course  ED Course as of 03/06/25 2231   Thu Mar 06, 2025   2103 Patient is medically cleared for psych.  [MM]   2231 Patient being admitted to [facility]. [MM]      ED Course User Index  [MM] Alem Farmer PA                                                       Medical Decision Making    This is a 44 year old female patient who presents to the ER with chief complaint of suicidal ideations. Patient endorses dealing with depression for awhile now. Today, she brought fentanyl with a plan to end her life. Denies HI, alcohol abuse.       Problems Addressed:  Acute cystitis without hematuria: complicated acute illness or injury  Suicidal ideations: complicated acute illness or injury    Amount and/or Complexity of Data Reviewed  Labs: ordered. Decision-making details documented in ED Course.    Risk  Prescription drug management.        Final diagnoses:   Acute cystitis without hematuria   Suicidal ideations       ED Disposition  ED Disposition       ED Disposition   DC/Transfer to Behavioral Health Condition   Stable    Comment   --               No follow-up provider specified.       Medication List      No changes were made to your prescriptions during this visit.            Alem Farmer PA  03/06/25 2231

## 2025-03-07 NOTE — NURSING NOTE
New admission this shift. Assessment completed by MO Loya. Introduced Self as Primary RN. Oriented Pt to unit. No distress noted or reported.

## 2025-03-07 NOTE — DISCHARGE INSTR - APPOINTMENTS
[facility]  [address] , [place] [id number]  [phone]    March 21 2025 at 8:00am with Graciela.

## 2025-03-07 NOTE — PAYOR COMM NOTE
"Keshia Wolff (44 y.o. Female)       Date of Birth   1980    Social Security Number       Address   [address] [address]    Home Phone   [phone]    MRN   [id number]       Jain   Zoroastrian    Marital Status                               Admission Date   3/6/25    Admission Type   Emergency    Admitting Provider   Hailee Mcpherson MD    Attending Provider   Angelika Pryor MD    Department, Room/Bed   [facility] ADULT [facility], 1018/2S       Discharge Date       Discharge Disposition       Discharge Destination                                 Attending Provider: Angelika Pryor MD    Allergies: No Known Allergies    Isolation: None   Infection: None   Code Status: CPR    Ht: 177.8 cm (70\")   Wt: 71.3 kg (157 lb 3.2 oz)    Admission Cmt: None   Principal Problem: Suicidal ideations [R45.851]                   Active Insurance as of 3/6/2025       Primary Coverage       Payor Plan Insurance Group Employer/Plan Group    [employer]HEM BLUE CROSS ANTHEM BLUE CROSS BLUE SHIELD PPO [id number]       Payor Plan Address Payor Plan Phone Number Payor Plan Fax Number Effective Dates    [address] [phone]  2024 - None Entered    [facility] [id number]         Subscriber Name Subscriber Birth Date Member ID       KESHIA WOLFF 1980 [id number]                     Emergency Contacts        (Rel.) Home Phone Work Phone Mobile Phone    Anu Arguello (Daughter) -- -- [phone]          PLEASE ATTACH THE INCLUDED CLINICAL INFORMATION TO REFERENCE NUMBER [id number]    RETURN FAX NUMBER [phone]    THIS IS A NEW/INITIAL AUTHORIZATION REQUEST FOR AN INPATIENT BEHAVIORAL HEALTH ADMISSION    PATIENT NAME:  KESHIA WOLFF  :  1980    ADMISSION DATE:  2025 AT 2301 PM EST    FACILITY:  [facility]  NPI:  [id number]  TAX ID:  [id number]  ADDRESS:  [address] [place] [address]    ATTENDING MD:  DR. ANGELIKA PRYOR  NPI:  " [id number]  ADDRESS:  SAME AS FACILITY    UR CONTACT:  MILTON FLETCHER RN  PHONE:  [phone]  FAX:  [phone]      DIAGNOSES:  F33.2 - MDD (MAJOR DEPRESSIVE DISORDER), RECURRENT, SEVERE, WITHOUT PSYCHOSIS  F90.9 - ADHD      PATIENT WAS AN EMERGENT ADMISSION FROM THE EMERGENCY ROOM      ED PROVIDER NOTE DATED 03/06/2025 IS COPIED BELOW       Alem Farmer PA   Physician Assistant  Emergency Medicine     ED Provider Notes      Cosign Needed     Date of Service: 03/06/25 2041  Creation Time: 03/06/25 2041   Related encounter: ED from 3/6/2025 in [facility] EMERGENCY DEPARTMENT with Daniel Carreon MD     Cosign Needed       Expand All Collapse All       Subjective[]Expand by Default  History of Present Illness  This is a 44 year old female patient who presents to the ER with chief complaint of suicidal ideations. Patient endorses dealing with depression for awhile now. Today, she brought fentanyl with a plan to end her life. Denies HI, alcohol abuse.         Review of Systems   Constitutional: Negative.  Negative for fever.   HENT: Negative.     Respiratory: Negative.     Cardiovascular: Negative.  Negative for chest pain.   Gastrointestinal: Negative.  Negative for abdominal pain.   Endocrine: Negative.    Genitourinary: Negative.  Negative for dysuria.   Skin: Negative.    Neurological: Negative.    Psychiatric/Behavioral:  Positive for suicidal ideas. Negative for agitation, behavioral problems, confusion, decreased concentration, dysphoric mood, hallucinations, self-injury and sleep disturbance. The patient is not nervous/anxious and is not hyperactive.    All other systems reviewed and are negative.        Medical History        Past Medical History:   Diagnosis Date    ADHD      Anxiety      Depression      Hepatitis C      Irritable bowel syndrome (IBS)      OCD (obsessive compulsive disorder)              Allergies   No Known Allergies        Surgical History         Past Surgical History:    Procedure Laterality Date    CARPAL TUNNEL RELEASE Left 07/25/2018     Procedure: CARPAL TUNNEL RELEASE;  Surgeon: Isaac Aiken MD;  Location:  [facility] OR;  Service: Orthopedics    CARPAL TUNNEL RELEASE Right 06/19/2019     Procedure: CARPAL TUNNEL RELEASE;  Surgeon: Isaac Aiken MD;  Location:  [facility] OR;  Service: Orthopedics    COLONOSCOPY        ENDOMETRIAL ABLATION        HYSTERECTOMY   2008     benign    TONSILLECTOMY                      Family History   Problem Relation Age of Onset    Diabetes Father      COPD Mother      Hypertension Mother      Cancer Maternal Aunt      Diabetes Maternal Aunt      Hypertension Maternal Grandmother      COPD Maternal Grandfather      Breast cancer Neg Hx           Social History   Social History            Socioeconomic History    Marital status:    Tobacco Use    Smoking status: Former       Current packs/day: 1.00       Average packs/day: 1 pack/day for 25.0 years (25.0 ttl pk-yrs)       Types: Cigarettes    Smokeless tobacco: Never   Vaping Use    Vaping status: Every Day    Substances: Nicotine   Substance and Sexual Activity    Alcohol use: No    Drug use: No    Sexual activity: Defer                     Objective  Physical Exam  Vitals and nursing note reviewed.   Constitutional:       General: She is not in acute distress.     Appearance: She is well-developed. She is not diaphoretic.   HENT:      Head: Normocephalic and atraumatic.      Right Ear: External ear normal.      Left Ear: External ear normal.      Nose: Nose normal.   Eyes:      Conjunctiva/sclera: Conjunctivae normal.      Pupils: Pupils are equal, round, and reactive to light.   Neck:      Vascular: No JVD.      Trachea: No tracheal deviation.   Cardiovascular:      Rate and Rhythm: Normal rate and regular rhythm.      Heart sounds: Normal heart sounds. No murmur heard.  Pulmonary:      Effort: Pulmonary effort is normal. No respiratory distress.      Breath sounds:  Normal breath sounds. No wheezing.   Abdominal:      General: Bowel sounds are normal.      Palpations: Abdomen is soft.      Tenderness: There is no abdominal tenderness.   Musculoskeletal:         General: No deformity. Normal range of motion.      Cervical back: Normal range of motion and neck supple.   Skin:     General: Skin is warm and dry.      Coloration: Skin is not pale.      Findings: No erythema or rash.   Neurological:      Mental Status: She is alert and oriented to person, place, and time.      Cranial Nerves: No cranial nerve deficit.   Psychiatric:         Behavior: Behavior normal.         Thought Content: Thought content is not paranoid or delusional. Thought content includes suicidal ideation. Thought content does not include homicidal ideation. Thought content includes suicidal plan. Thought content does not include homicidal plan.            Procedures                 Results for orders placed or performed during the hospital encounter of 03/06/25   Comprehensive Metabolic Panel     Collection Time: 03/06/25  7:59 PM     Specimen: Blood   Result Value Ref Range     Glucose 99 65 - 99 mg/dL     BUN 9 6 - 20 mg/dL     Creatinine 0.71 0.57 - 1.00 mg/dL     Sodium 136 136 - 145 mmol/L     Potassium 4.0 3.5 - 5.2 mmol/L     Chloride 101 98 - 107 mmol/L     CO2 25.2 22.0 - 29.0 mmol/L     Calcium 8.9 8.6 - 10.5 mg/dL     Total Protein 7.1 6.0 - 8.5 g/dL     Albumin 4.3 3.5 - 5.2 g/dL     ALT (SGPT) 16 1 - 33 U/L     AST (SGOT) 15 1 - 32 U/L     Alkaline Phosphatase 43 39 - 117 U/L     Total Bilirubin 0.2 0.0 - 1.2 mg/dL     Globulin 2.8 gm/dL     A/G Ratio 1.5 g/dL     BUN/Creatinine Ratio 12.7 7.0 - 25.0     Anion Gap 9.8 5.0 - 15.0 mmol/L     eGFR 108.3 >60.0 mL/min/1.73   Ethanol     Collection Time: 03/06/25  7:59 PM     Specimen: Blood   Result Value Ref Range     Ethanol <10 0 - 10 mg/dL     Ethanol % <0.010 %   Magnesium     Collection Time: 03/06/25  7:59 PM     Specimen: Blood   Result Value  Ref Range     Magnesium 2.0 1.6 - 2.6 mg/dL   CBC Auto Differential     Collection Time: 03/06/25  7:59 PM     Specimen: Blood   Result Value Ref Range     WBC 7.62 3.40 - 10.80 10*3/mm3     RBC 4.24 3.77 - 5.28 10*6/mm3     Hemoglobin 12.8 12.0 - 15.9 g/dL     Hematocrit 38.3 34.0 - 46.6 %     MCV 90.3 79.0 - 97.0 fL     MCH 30.2 26.6 - 33.0 pg     MCHC 33.4 31.5 - 35.7 g/dL     RDW 12.7 12.3 - 15.4 %     RDW-SD 41.7 37.0 - 54.0 fl     MPV 8.8 6.0 - 12.0 fL     Platelets 298 140 - 450 10*3/mm3     Neutrophil % 54.9 42.7 - 76.0 %     Lymphocyte % 34.6 19.6 - 45.3 %     Monocyte % 7.7 5.0 - 12.0 %     Eosinophil % 1.8 0.3 - 6.2 %     Basophil % 0.7 0.0 - 1.5 %     Immature Grans % 0.3 0.0 - 0.5 %     Neutrophils, Absolute 4.18 1.70 - 7.00 10*3/mm3     Lymphocytes, Absolute 2.64 0.70 - 3.10 10*3/mm3     Monocytes, Absolute 0.59 0.10 - 0.90 10*3/mm3     Eosinophils, Absolute 0.14 0.00 - 0.40 10*3/mm3     Basophils, Absolute 0.05 0.00 - 0.20 10*3/mm3     Immature Grans, Absolute 0.02 0.00 - 0.05 10*3/mm3     nRBC 0.0 0.0 - 0.2 /100 WBC   Urinalysis With Microscopic If Indicated (No Culture) - Urine, Clean Catch     Collection Time: 03/06/25  8:12 PM     Specimen: Urine, Clean Catch   Result Value Ref Range     Color, UA Yellow Yellow, Straw     Appearance, UA Cloudy (A) Clear     pH, UA 6.5 5.0 - 8.0     Specific Gravity, UA <=1.005 1.005 - 1.030     Glucose, UA Negative Negative     Ketones, UA Negative Negative     Bilirubin, UA Negative Negative     Blood, UA Negative Negative     Protein, UA Negative Negative     Leuk Esterase, UA Moderate (2+) (A) Negative     Nitrite, UA Negative Negative     Urobilinogen, UA 0.2 E.U./dL 0.2 - 1.0 E.U./dL   Urine Drug Screen - Urine, Clean Catch     Collection Time: 03/06/25  8:12 PM     Specimen: Urine, Clean Catch   Result Value Ref Range     THC, Screen, Urine Negative Negative     Phencyclidine (PCP), Urine Negative Negative     Cocaine Screen, Urine Negative Negative      Methamphetamine, Ur Negative Negative     Opiate Screen Positive (A) Negative     Amphetamine Screen, Urine Negative Negative     Benzodiazepine Screen, Urine Negative Negative     Tricyclic Antidepressants Screen Negative Negative     Methadone Screen, Urine Negative Negative     Barbiturates Screen, Urine Negative Negative     Oxycodone Screen, Urine Negative Negative     Buprenorphine, Screen, Urine Negative Negative   Fentanyl, Urine - Urine, Clean Catch     Collection Time: 03/06/25  8:12 PM     Specimen: Urine, Clean Catch   Result Value Ref Range     Fentanyl, Urine Negative Negative   Urinalysis, Microscopic Only - Urine, Clean Catch     Collection Time: 03/06/25  8:12 PM     Specimen: Urine, Clean Catch   Result Value Ref Range     RBC, UA 0-2 None Seen, 0-2 /HPF     WBC, UA 6-10 (A) None Seen, 0-2 /HPF     Bacteria, UA 2+ (A) None Seen /HPF     Squamous Epithelial Cells, UA 3-6 (A) None Seen, 0-2 /HPF     Hyaline Casts, UA None Seen None Seen /LPF     Methodology Manual Light Microscopy     [facility] Urine Culture Tube - Urine, Clean Catch     Collection Time: 03/06/25  8:12 PM     Specimen: Urine, Clean Catch   Result Value Ref Range     Extra Tube Hold for add-ons.              ED Course      ED Course as of 03/06/25 2231   Thu Mar 06, 2025   2103 Patient is medically cleared for psych.  [MM]   2231 Patient being admitted to [facility]. [MM]       ED Course User Index  [MM] Alem Farmer PA                                                      Medical Decision Making     This is a 44 year old female patient who presents to the ER with chief complaint of suicidal ideations. Patient endorses dealing with depression for awhile now. Today, she brought fentanyl with a plan to end her life. Denies HI, alcohol abuse.         Problems Addressed:  Acute cystitis without hematuria: complicated acute illness or injury  Suicidal ideations: complicated acute illness or injury     Amount and/or Complexity of  Data Reviewed  Labs: ordered. Decision-making details documented in ED Course.     Risk  Prescription drug management.           Final diagnoses:   Acute cystitis without hematuria   Suicidal ideations         ED Disposition  ED Disposition         ED Disposition   DC/Transfer to Behavioral Health Condition   Stable    Comment   --                   No follow-up provider specified.         Medication List       No changes were made to your prescriptions during this visit.               Alem Farmer PA  25                 Revision History             H&P DATED 2025 IS COPIED BELOW:        Angelika Larson MD   Physician  Psychiatry     H&P      Addendum     Date of Service: 25  Creation Time: 25     Expand All Collapse All            INITIAL PSYCHIATRIC HISTORY & PHYSICAL     Patient Identification:  Name:  Keshia Jimenez  Age:  44 y.o.  Sex:  female  :  1980  MRN:  [id number]   Visit Number:  [id number]  Primary Care Physician:  Steffanie Chavez MD     SUBJECTIVE     CC/Focus of Exam: Suicidal ideations     HPI: Keshia Jimenez is a 44 y.o. female who was admitted on 3/6/2025 with complaints of suicidal ideations. She states she has been feeling suicidal for the last three months and it got worse last four weeks. She states she has thought about taking an overdose of fentanyl. She states she has been feeling depressed since 2021 when her step-mother committed suicide. She took a bunch of pills and drank tequila. Patient states she was close to her step-mother and has had a hard time dealing with it. More recent stressors include patient went through a separation and  2023 but still lived together and moved out 2024 and in September her grandmother passed away, and patient has to now take care of a 19 y.o. autistic cousin that grandmother was taking care of and it has been very difficult.   Associated symptoms include excessive  sleep, anhedonia, feelings of hopelessness, no energy, difficulty with memory and concentration, and having suicidal ideations. No  radha, hypomania or hallucinations.      PAST PSYCHIATRIC HX: The patient reports she was admitted to inpatient psych unit is Oct 2022 in [facility] for depression. She is currently in outpatient treatment for depression. She has also been in treatment for ADHD.      SUBSTANCE USE HX: None reported at this time. 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      SOCIAL HX:   Social History   Social History            Socioeconomic History    Marital status:    Tobacco Use    Smoking status: Former       Current packs/day: 1.00       Average packs/day: 1 pack/day for 25.0 years (25.0 ttl pk-yrs)       Types: Cigarettes    Smokeless tobacco: Never   Vaping Use    Vaping status: Every Day    Substances: Nicotine   Substance and Sexual Activity    Alcohol use: No    Drug use: No    Sexual activity: Defer               Medical History        Past Medical History:   Diagnosis Date    ADHD      Anxiety      Depression      Hepatitis C      Irritable bowel syndrome (IBS)      OCD (obsessive compulsive disorder)                 Surgical History         Past Surgical History:   Procedure Laterality Date    CARPAL TUNNEL RELEASE Left 07/25/2018     Procedure: CARPAL TUNNEL RELEASE;  Surgeon: Isaac Aiken MD;  Location: [facility] OR;  Service: Orthopedics    CARPAL TUNNEL RELEASE Right 06/19/2019     Procedure: CARPAL TUNNEL RELEASE;  Surgeon: Isaac Aiken MD;  Location: [facility] OR;  Service: Orthopedics    COLONOSCOPY        ENDOMETRIAL ABLATION        HYSTERECTOMY   2008     benign    TONSILLECTOMY                      Family History   Problem Relation Age of Onset    Diabetes Father      COPD Mother      Hypertension Mother      Cancer Maternal Aunt      Diabetes Maternal  Aunt      Hypertension Maternal Grandmother      COPD Maternal Grandfather      Breast cancer Neg Hx              Prescriptions Prior to Admission           Medications Prior to Admission   Medication Sig Dispense Refill Last Dose/Taking    atomoxetine (STRATTERA) 40 MG capsule Take 1 capsule by mouth Daily.     3/6/2025 Morning    gabapentin (NEURONTIN) 300 MG capsule Take 1 capsule by mouth 2 (Two) Times a Day As Needed (Hot Flashes).     Unknown               ALLERGIES:  Patient has no known allergies.     Temp:  [97.9 °F (36.6 °C)-98.7 °F (37.1 °C)] 98.7 °F (37.1 °C)  Heart Rate:  [66-85] 70  Resp:  [16-18] 18  BP: (111-118)/(62-71) 111/62     REVIEW OF SYSTEMS:  Review of Systems   Constitutional: Negative.    HENT: Negative.     Eyes: Negative.    Respiratory: Negative.     Cardiovascular: Negative.    Gastrointestinal: Negative.    Endocrine: Negative.    Genitourinary: Negative.    Musculoskeletal: Negative.    Skin: Negative.    Allergic/Immunologic: Negative.    Neurological: Negative.    Hematological: Negative.    Psychiatric/Behavioral:  Positive for dysphoric mood and suicidal ideas. The patient is nervous/anxious.          OBJECTIVE    PHYSICAL EXAM:  Physical Exam  Constitutional:  Appears well-developed and well-nourished.   HENT:   Head: Normocephalic and atraumatic.   Right Ear: External ear normal.   Left Ear: External ear normal.   Mouth/Throat: Oropharynx is clear and moist.   Eyes: Pupils are equal, round, and reactive to light. Conjunctivae and EOM are normal.   Neck: Normal range of motion. Neck supple.   Cardiovascular: Normal rate, regular rhythm and normal heart sounds.    Respiratory: Effort normal and breath sounds normal. No respiratory distress. No wheezes.   GI: Soft. Bowel sounds are normal.No distension. There is no tenderness.   Musculoskeletal: Normal range of motion. No edema or deformity.   Neurological:  Cranial Nerves: I. No anosmia. II: No visual disturbance. III, IV VI:  EOMI, PERRLA. V: Corneal reflext intact, no abnormal sensations. VII: No facial palsy, or altered sensation. VIII: Hearing intact, balance intact. IX: Intact ah reflex. X: Normal phonation, swallowing. XI: Normal shrug and head movement. XII: Intact tongue movements  Coordination normal. No lateralizing signs.  Skin: Skin is warm and dry. No rash noted. No erythema.      MENTAL STATUS EXAM:   Hygiene:   fair  Cooperation:  Cooperative  Eye Contact:  Fair  Psychomotor Behavior:  Appropriate  Affect:  Restricted  Hopelessness: 5  Speech:  Normal  Thought Progress: Goal directed and Linear  Thought Content:  Normal  Suicidal:  Suicidal Ideation  Homicidal:  None  Hallucinations:  None  Delusion:  None  Memory:  Intact  Orientation:  Person, Place, Time, and Situation  Reliability:  fair  Insight:  Fair  Judgement:  Fair  Impulse Control:  Fair     Imaging Results (Last 24 Hours)         ** No results found for the last 24 hours. **                ECG/EMG Results (most recent)         None                      Lab Results   Component Value Date     GLUCOSE 99 03/06/2025     BUN 9 03/06/2025     CREATININE 0.71 03/06/2025     EGFRIFNONA 91 06/14/2019     BCR 12.7 03/06/2025     CO2 25.2 03/06/2025     CALCIUM 8.9 03/06/2025     ALBUMIN 4.3 03/06/2025     AST 15 03/06/2025     ALT 16 03/06/2025               Lab Results   Component Value Date     WBC 7.62 03/06/2025     HGB 12.8 03/06/2025     HCT 38.3 03/06/2025     MCV 90.3 03/06/2025      03/06/2025         Last Urine Toxicity               Latest Ref Rng & Units 3/6/2025   LAST URINE TOXICITY RESULTS   Amphetamine, Urine Qual Negative Negative    Barbiturates Screen, Urine Negative Negative    Benzodiazepine Screen, Urine Negative Negative    Buprenorphine, Screen, Urine Negative Negative    Cocaine Screen, Urine Negative Negative    Fentanyl, Urine Negative Negative    Methadone Screen , Urine Negative Negative    Methamphetamine, Ur Negative Negative          Details                          Brief Urine Lab Results  (Last result in the past 365 days)          Color   Clarity   Blood   Leuk Est   Nitrite   Protein   CREAT   Urine HCG         03/06/25 2012 Yellow    Cloudy    Negative    Moderate (2+)    Negative    Negative                             DATA  Labs reviewed. Hep C reactive. UA shows cloudy appearance, moderate leukocyte esterase, 6-10 WBC, 2+ bacteria, 3-6 sq epi cells. UDS positive for opiate.   EKG reviewed. QTc 424 ms  AYAN reviewed.   Record reviewed. No previous treatment noted in this hospital for mental health or substance use problems.         Strengths: Motivated for treatment     Weaknesses:Substance use and Poor coping skills     Code status:  Full  Discussed code status with patient.     ASSESSMENT & PLAN:     Hospital bed: No       Suicidal ideations  -SP 3       MDD (major depressive disorder), recurrent severe, without psychosis  -Start escitalopram 10 mg daily       ADHD  -Strattera       UTI  -Omnicef       Nicotine use disorder  -Nicotine replacement        The patient has been admitted for safety and stabilization.  Patient will be monitored for suicidality daily and maintained on Special Precautions Level 3 (q15 min checks) .  The patient will have individual and group therapy with a master's level therapist. A master treatment plan will be developed and agreed upon by the patient and his/her treatment team.  The patient's estimated length of stay in the hospital is 5-7 days.                     Revision History    Routing History          Current Scheduled Medications  Collapse  Hide  (From now, onward)    Start   Ordered Stop   03/07/25 0900  nicotine (NICODERM CQ) 21 MG/24HR patch 1 patch  1 patch,   Transdermal,   Every 24 Hours Scheduled        References:    Lexicomp    Pediatrics    03/06/25 2327 --   03/07/25 0900  cefdinir (OMNICEF) capsule 300 mg  300 mg,   Oral,   Every 12 Hours Scheduled        References:    Antimicrobial  Duration of Therapy Summary    Cross Reactivity Chart    [facility]    Pediatrics    03/06/25 2327 03/14/25 0859   03/07/25 0900  atomoxetine (STRATTERA) capsule 40 mg  40 mg,   Oral,   Daily        References:    [facility]    Pediatrics    03/06/25 2330           NO PRN MEDICATIONS REQUIRED SINCE ADMISSION

## 2025-03-07 NOTE — PLAN OF CARE
Goal Outcome Evaluation:  Plan of Care Reviewed With: patient  Patient Agreement with Plan of Care: agrees  Consent Given to Review Plan with: Anu Kraft  Progress: improving  Outcome Evaluation: Therapist met with patient to review care plan, scoail history, aftercare recommendation, and disposition plan; patient agreeable.           Problem: Adult Behavioral Health Plan of Care  Goal: Plan of Care Review  Outcome: Progressing  Flowsheets (Taken 3/7/2025 1334)  Consent Given to Review Plan with: Anu Kraft  Progress: improving  Patient Agreement with Plan of Care: agrees  Outcome Evaluation:   Therapist met with patient to review care plan, scoail history, aftercare recommendation, and disposition plan   patient agreeable.  Plan of Care Reviewed With: patient  Goal: Patient-Specific Goal (Individualization)  Outcome: Progressing  Flowsheets  Taken 3/7/2025 1334 by Arnie Lujan  Patient/Family-Specific Goals (Include Timeframe): Identify 2-3 coping skills, complete aftercare plan, complete safety plan, and deny SI/HI within 1-7 days.  Individualized Care Needs: Therapist to offer 1-4 therapy sessions, aftercare planning, safety planning, family education, daily groups, and brief CBT/MI interventions.  Taken 3/7/2025 1306 by Arnie Lujan  Patient Personal Strengths:   resilient   motivated for treatment   expressive of needs   expressive of emotions   resourceful   self-reliant  Patient Vulnerabilities:   adverse childhood experience(s)   poor impulse control   lacks insight into illness   recent loss  Taken 3/6/2025 2317 by Shalini Zimmerman, RN  Anxieties, Fears or Concerns: none voiced  Goal: Optimized Coping Skills in Response to Life Stressors  Outcome: Progressing  Intervention: Promote Effective Coping Strategies  Flowsheets (Taken 3/7/2025 1334)  Supportive Measures:   active listening utilized   counseling provided   decision-making supported   positive reinforcement provided   mindfulness  techniques promoted   self-reflection promoted   self-responsibility promoted   verbalization of feelings encouraged  Goal: Develops/Participates in Therapeutic [place] to Support Successful Transition  Outcome: Progressing  Intervention: Foster Therapeutic [place]  Flowsheets (Taken 3/7/2025 1334)  Trust Relationship/Rapport:   care explained   reassurance provided   choices provided   thoughts/feelings acknowledged   emotional support provided   questions answered   empathic listening provided   questions encouraged  Intervention: Mutually Develop Transition Plan  Flowsheets  Taken 3/7/2025 1334 by Arnie Lujan  Outpatient/Agency/Support Group Needs:   outpatient medication management   outpatient counseling   outpatient psychiatric care (specify)  Discharge Coordination/Progress:   Therapist met with patient to complete discharge needs assessment   patient considering outpatient services.  Transition Support:   crisis management plan promoted   crisis management plan verbalized   follow-up care discussed   follow-up care coordinated  Anticipated Discharge Disposition: home with family  Concerns to be Addressed:   medication   mental health  Readmission Within the Last 30 Days: no previous admission in last 30 days  Taken 3/6/2025 2341 by Shalini Zimmerman, RN  Transportation Anticipated: family or friend will provide  Patient/Family Anticipated Services at Transition:      mental health services   outpatient care  Patient/Family Anticipates Transition to: home with family     DATA:  Therapist met individually with Patient this date for initial evaluation.  Introduced self as Therapist and the role of a positive therapeutic relationship; Patient agreeable.      Therapist encouraged Patient to speak openly and honestly about any issues or stressors during treatment stay. Therapist explained how open communication is significant to providing most effective care.      Therapist completed psychosocial  assessment, integrated summary, reviewed care plans, disposition planning and discussed hospitalization expectations and treatment goals this date.     Therapist provided education regarding different levels of care and is recommending outpatient therapy and medication management for most appropriate aftercare. Patient agreeable. Patient signed consent for [facility] and [facility].     Therapist is recommending family involvement prior to discharge and it's importance. Patient agreeable and signed consent for daughter, Anu Miller.     Therapist spoke with patient's daughter on this date. Daughter reports that the patient's mental health has been on a steady decline for the last few years and she just has not taken the time to get treatment. Daughter states that there are no concerns on the families part regarding discharge.     Safety planning completed with daughterAnu. Daughter agreeable to limited access to firearms, sharp objects, and medications that might pose a safety risk.     CLINICAL MANUVERING/INTERVENTIONS:  Assisted Patient in processing session content; acknowledged and normalized Patient’s thoughts, feelings, and concerns by utilizing a person-centered approach in efforts to build appropriate rapport and a positive therapeutic relationship with open and honest communication. Allowed Patient to ventilate regarding current stressors and triggers for negative emotions and thoughts in a safe nonjudgmental environment with unconditional positive regard, active listening skills, and empathy.      ASSESSMENT: Patient is a 44 year old female who presented to the ED with SI. Patient works in healthcare and lives in a home with her 19 year old autistic cousin who she cares for. Patient has no prior admissions to the unit.     Therapist met with patient 1:1 in office. Patient was calm and cooperative with therapy. Patient was somber in affect and struggled to identify positives in her life. Patient  reports that much has happened over the last few years including her separation and divorce, her grandmother's death, and her subsequently taking over the care of her cousin. Patient reports that all of these things have happened so fast and she is unable to process them before another thing comes along. Therapist and patient discussed coping skills for depression and anxiety. Therapist also discussed additional supports for the care of her cousin. Patient denies current SI/HI/AVH.     PLAN: Patient will receive 24/7 nursing monitoring and daily psychiatrist evaluation by a multidisciplinary team.    Patient will continue stabilization at this time.     Patient is agreeable for outpatient services with [facility] and [facility].     Public assistance with transportation will not be needed. Family member will provide.

## 2025-03-08 PROCEDURE — 99232 SBSQ HOSP IP/OBS MODERATE 35: CPT | Performed by: PSYCHIATRY & NEUROLOGY

## 2025-03-08 RX ORDER — ESCITALOPRAM OXALATE 10 MG/1
10 TABLET ORAL DAILY
Status: DISCONTINUED | OUTPATIENT
Start: 2025-03-08 | End: 2025-03-10 | Stop reason: HOSPADM

## 2025-03-08 RX ORDER — FLUCONAZOLE 150 MG/1
150 TABLET ORAL ONCE
Status: COMPLETED | OUTPATIENT
Start: 2025-03-08 | End: 2025-03-08

## 2025-03-08 RX ADMIN — CEFDINIR 300 MG: 300 CAPSULE ORAL at 08:37

## 2025-03-08 RX ADMIN — NICOTINE 1 PATCH: 21 PATCH TRANSDERMAL at 08:36

## 2025-03-08 RX ADMIN — TRAZODONE HYDROCHLORIDE 50 MG: 50 TABLET ORAL at 20:35

## 2025-03-08 RX ADMIN — ACETAMINOPHEN 650 MG: 325 TABLET, FILM COATED ORAL at 17:08

## 2025-03-08 RX ADMIN — CEFDINIR 300 MG: 300 CAPSULE ORAL at 20:35

## 2025-03-08 RX ADMIN — FLUCONAZOLE 150 MG: 150 TABLET ORAL at 11:39

## 2025-03-08 RX ADMIN — ATOMOXETINE 40 MG: 40 CAPSULE ORAL at 08:37

## 2025-03-08 RX ADMIN — ESCITALOPRAM OXALATE 10 MG: 10 TABLET ORAL at 10:48

## 2025-03-08 NOTE — PROGRESS NOTES
"INPATIENT PSYCHIATRIC PROGRESS NOTE    Name:  Keshia Jimenez  :  1980  MRN:  [id number]  Visit Number:  [id number]  Length of stay:  2    SUBJECTIVE    CC/Focus of Exam: depression, SI    INTERVAL HISTORY:  The patient reports she is feeling more anxious and she has not been able to sleep good as she is not comfortable when she is away from home. She states she has realized that her kids love her and she has a lot to live for.   Depression rating 3/10  Anxiety rating 3/10  Sleep: Not good  Withdrawal sx: None  Cravin/10    Review of Systems   HENT: Negative.     Respiratory: Negative.     Cardiovascular: Negative.    Psychiatric/Behavioral:  Positive for decreased concentration. The patient is nervous/anxious.        OBJECTIVE    Temp:  [97.1 °F (36.2 °C)-98.9 °F (37.2 °C)] 98.6 °F (37 °C)  Heart Rate:  [69-78] 78  Resp:  [16-18] 18  BP: ()/(64-84) 99/64    MENTAL STATUS EXAM:  Appearance:Casually dressed, good hygeine.   Cooperation:Cooperative  Psychomotor: No psychomotor agitation/retardation, No EPS, No motor tics  Speech-normal rate, amount.  Mood \"depressed and anxious\"   Affect-congruent, appropriate, stable  Thought Content-goal directed, no delusional material present  Thought process-linear, organized.  Suicidality: No SI  Homicidality: No HI  Perception: No AH/VH  Insight-fair   Judgement-fair    Lab Results (last 24 hours)       ** No results found for the last 24 hours. **               Imaging Results (Last 24 Hours)       ** No results found for the last 24 hours. **               ECG/EMG Results (most recent)       None             ALLERGIES: Patient has no known allergies.    Medication Review:   Scheduled Medications:  atomoxetine, 40 mg, Oral, Daily  cefdinir, 300 mg, Oral, Q12H  nicotine, 1 patch, Transdermal, Q24H         PRN Medications:    acetaminophen    aluminum-magnesium hydroxide-simethicone    benzonatate    benztropine **OR** benztropine    famotidine    hydrOXYzine   "  ibuprofen    loperamide    magnesium hydroxide    ondansetron ODT    polyethylene glycol    sodium chloride    traZODone   All medications reviewed.    ASSESSMENT & PLAN:      Suicidal ideations  -SP 3       MDD (major depressive disorder), recurrent severe, without psychosis  -Start escitalopram 10 mg daily       ADHD  -Strattera       UTI  -Omnicef       Nicotine use disorder  -Nicotine replacement    Special precautions: Special Precautions Level 3 (q15 min checks) .    Behavioral Health Treatment Plan and Problem List: I have reviewed and approved the Behavioral Health Treatment Plan and Problem list.  The patient has had a chance to review and agrees with the treatment plan.    Copied text in portions of this note has been reviewed and is accurate as of 03/08/25         Clinician:  Angelika Larson MD  03/08/25  08:46 EST

## 2025-03-08 NOTE — PLAN OF CARE
Goal Outcome Evaluation:  Plan of Care Reviewed With: patient  Patient Agreement with Plan of Care: agrees        Outcome Evaluation: Pt reports good appetite and sleep. A/D 6. Denies SI/HI/AVH

## 2025-03-08 NOTE — PLAN OF CARE
Goal Outcome Evaluation:  Plan of Care Reviewed With: patient  Plan of Care Reviewed With: patient  Patient Agreement with Plan of Care: agrees     Progress: improving  Outcome Evaluation: Patient cooperative, has been in day room wtih peer this shift. Patient reports medications are helping. Patient denies suicidal or homicidal ideation

## 2025-03-09 PROCEDURE — 99232 SBSQ HOSP IP/OBS MODERATE 35: CPT | Performed by: PSYCHIATRY & NEUROLOGY

## 2025-03-09 RX ADMIN — ATOMOXETINE 40 MG: 40 CAPSULE ORAL at 08:36

## 2025-03-09 RX ADMIN — IBUPROFEN 400 MG: 400 TABLET, FILM COATED ORAL at 08:38

## 2025-03-09 RX ADMIN — TRAZODONE HYDROCHLORIDE 50 MG: 50 TABLET ORAL at 21:05

## 2025-03-09 RX ADMIN — ESCITALOPRAM OXALATE 10 MG: 10 TABLET ORAL at 08:36

## 2025-03-09 RX ADMIN — CEFDINIR 300 MG: 300 CAPSULE ORAL at 21:05

## 2025-03-09 RX ADMIN — ACETAMINOPHEN 650 MG: 325 TABLET, FILM COATED ORAL at 21:05

## 2025-03-09 RX ADMIN — CEFDINIR 300 MG: 300 CAPSULE ORAL at 08:36

## 2025-03-09 RX ADMIN — ACETAMINOPHEN 650 MG: 325 TABLET, FILM COATED ORAL at 03:35

## 2025-03-09 RX ADMIN — NICOTINE 1 PATCH: 21 PATCH TRANSDERMAL at 08:36

## 2025-03-09 NOTE — PLAN OF CARE
Goal Outcome Evaluation:  Plan of Care Reviewed With: patient  Patient Agreement with Plan of Care: agrees        Outcome Evaluation: Pt reports good appetite and poor sleep. A 3 D 4. Denies SI/HI/AVH

## 2025-03-09 NOTE — PROGRESS NOTES
"INPATIENT PSYCHIATRIC PROGRESS NOTE    Name:  Keshia Jimenez  :  1980  MRN:  [id number]  Visit Number:  [id number]  Length of stay:  3    SUBJECTIVE    CC/Focus of Exam: depression, SI    INTERVAL HISTORY:  The patient reports she is feeling better. She took the Lexapro and she feels it is helping.   Depression rating 2/10  Anxiety rating 2/10  Sleep: good  Withdrawal sx: None  Cravin/10    Review of Systems   HENT: Negative.     Respiratory: Negative.     Cardiovascular: Negative.        OBJECTIVE    Temp:  [98.1 °F (36.7 °C)-98.7 °F (37.1 °C)] 98.1 °F (36.7 °C)  Heart Rate:  [64-88] 64  Resp:  [16] 16  BP: (110-112)/(70-71) 112/71    MENTAL STATUS EXAM:  Appearance:Casually dressed, good hygeine.   Cooperation:Cooperative  Psychomotor: No psychomotor agitation/retardation, No EPS, No motor tics  Speech-normal rate, amount.  Mood \"better\"   Affect-congruent, appropriate, stable  Thought Content-goal directed, no delusional material present  Thought process-linear, organized.  Suicidality: No SI  Homicidality: No HI  Perception: No AH/VH  Insight-fair   Judgement-fair    Lab Results (last 24 hours)       ** No results found for the last 24 hours. **               Imaging Results (Last 24 Hours)       ** No results found for the last 24 hours. **               ECG/EMG Results (most recent)       None             ALLERGIES: Patient has no known allergies.    Medication Review:   Scheduled Medications:  atomoxetine, 40 mg, Oral, Daily  cefdinir, 300 mg, Oral, Q12H  escitalopram, 10 mg, Oral, Daily  nicotine, 1 patch, Transdermal, Q24H         PRN Medications:    acetaminophen    aluminum-magnesium hydroxide-simethicone    benzonatate    benztropine **OR** benztropine    famotidine    hydrOXYzine    ibuprofen    loperamide    magnesium hydroxide    ondansetron ODT    polyethylene glycol    sodium chloride    traZODone   All medications reviewed.    ASSESSMENT & PLAN:      Suicidal ideations  -SP 3       MDD " (major depressive disorder), recurrent severe, without psychosis  -Started escitalopram 10 mg daily       ADHD  -Strattera       UTI  -Omnicef       Nicotine use disorder  -Nicotine replacement    Special precautions: Special Precautions Level 3 (q15 min checks) .    Behavioral Health Treatment Plan and Problem List: I have reviewed and approved the Behavioral Health Treatment Plan and Problem list.  The patient has had a chance to review and agrees with the treatment plan.    Copied text in portions of this note has been reviewed and is accurate as of 03/09/25         Clinician:  Angelika Larson MD  03/09/25  12:19 EDT

## 2025-03-09 NOTE — PLAN OF CARE
Problem: Adult Behavioral Health Plan of Care  Goal: Plan of Care Review  Outcome: Progressing  Flowsheets  Taken 3/9/2025 1557  Progress: improving  Patient Agreement with Plan of Care: agrees  Outcome Evaluation: client states good appetite and sleep. denies si/hi/avh. calm and cooeprative  Plan of Care Reviewed With: patient  Taken 3/9/2025 0841  Patient Agreement with Plan of Care: agrees   Goal Outcome Evaluation:  Plan of Care Reviewed With: patient  Plan of Care Reviewed With: patient  Patient Agreement with Plan of Care: agrees     Progress: improving  Outcome Evaluation: client states good appetite and sleep. denies si/hi/avh. calm and cooeprative

## 2025-03-10 VITALS
RESPIRATION RATE: 18 BRPM | BODY MASS INDEX: 22.5 KG/M2 | WEIGHT: 157.2 LBS | TEMPERATURE: 98.3 F | HEIGHT: 70 IN | HEART RATE: 66 BPM | DIASTOLIC BLOOD PRESSURE: 63 MMHG | SYSTOLIC BLOOD PRESSURE: 99 MMHG | OXYGEN SATURATION: 98 %

## 2025-03-10 PROCEDURE — 99238 HOSP IP/OBS DSCHRG MGMT 30/<: CPT | Performed by: PSYCHIATRY & NEUROLOGY

## 2025-03-10 RX ORDER — ESCITALOPRAM OXALATE 10 MG/1
10 TABLET ORAL DAILY
Qty: 30 TABLET | Refills: 0 | Status: SHIPPED | OUTPATIENT
Start: 2025-03-11

## 2025-03-10 RX ORDER — CEFDINIR 300 MG/1
300 CAPSULE ORAL EVERY 12 HOURS SCHEDULED
Qty: 7 CAPSULE | Refills: 0 | Status: SHIPPED | OUTPATIENT
Start: 2025-03-10 | End: 2025-03-14

## 2025-03-10 RX ORDER — TRAZODONE HYDROCHLORIDE 50 MG/1
50 TABLET ORAL NIGHTLY PRN
Qty: 30 TABLET | Refills: 0 | Status: SHIPPED | OUTPATIENT
Start: 2025-03-10

## 2025-03-10 RX ADMIN — NICOTINE 1 PATCH: 21 PATCH TRANSDERMAL at 08:41

## 2025-03-10 RX ADMIN — ATOMOXETINE 40 MG: 40 CAPSULE ORAL at 08:41

## 2025-03-10 RX ADMIN — ESCITALOPRAM OXALATE 10 MG: 10 TABLET ORAL at 08:41

## 2025-03-10 RX ADMIN — CEFDINIR 300 MG: 300 CAPSULE ORAL at 08:41

## 2025-03-10 NOTE — PLAN OF CARE
Goal Outcome Evaluation:  Plan of Care Reviewed With: patient  Plan of Care Reviewed With: patient  Patient Agreement with Plan of Care: agrees     Progress: improving  Outcome Evaluation: Pt reports anxiety 4/10 and depression 1/10. Pt denies SI/HI/AVH. Pt reports good sleep and appetite.

## 2025-03-10 NOTE — PROGRESS NOTES
Patient and therapist met on this date to discuss discharge and safety planning. Patient reports that she is feeling better and feels better quipped to handle the stressors of her life. Patient does report that she does continue to have some stress regarding her life, but nothing out of the ordinary. Patient denies SI/HI/AVH.     Safety planning completed with patient on this date with patient. Patient agreeable to limited access to firearms, sharp objects, medications, and substances that might pose a risk to patient's safety in return home. Patient informed that distressing thoughts/desires to use may return. Patient informed to contact [phone]/report to nearest ED in the event of these feelings arising.     Safety planning completed with daughterAnu on 3/7.     Patient will follow up with [facility] and Nick.     Patient's family will provide transport.

## 2025-03-10 NOTE — DISCHARGE SUMMARY
":  1980  MRN:  [id number]  Visit Number:  [id number]      Date of Admission:3/6/2025   Date of Discharge:  3/10/2025    Discharge Diagnosis:  Principal Problem:    Suicidal ideations  Active Problems:    MDD (major depressive disorder), recurrent severe, without psychosis    ADHD    UTI      Admission Diagnosis:  Suicidal ideations [R45.851]     BROOKLYNN Jimenez is a 44 y.o. female who was admitted on 3/6/2025 with complaints of suicidal ideations.   For details please see H&P dated 3/7/25.     Hospital Course  Patient is a 44 y.o. female presented with depression and suicidal ideations. The patient was admitted to the [facility] adult psych unit for safety, further evaluation and treatment.  The patient was started on escitalopram 10 mg daily to help with the symptoms of depression. The patient was able to take the medication without any adverse effects and she reported it helped her feel better. She was continued on atomoxetine for ADHD and cefdinir was added tor UTI. She also took trazodone to help her sleep and wanted to continue taking it at discharge.  The patient was also able to take part in individual and group counseling sessions and work on appropriate coping skills. She admitted that she tried to take care of everyone except herself and planned to make some changes and have better boundaries in her life.   The patient made steady improvement in her mood and expressed feeling more positive and hopeful about future. Sleep and appetite were improved.  The day of discharge the patient was calm, cooperative and pleasant. Mood was reported to be good, and denied SI/HI/AVH. Also reported no medication side effects.        Mental Status Exam upon discharge:   Mood \"good\"   Affect-congruent, appropriate, stable  Thought Content-goal directed, no delusional material present  Thought process-linear, organized.  Suicidality: No SI  Homicidality: No HI  Perception: No AH/VH    Procedures " Performed         Consults:   Consults       No orders found from 2/5/2025 to 3/7/2025.            Pertinent Test Results:   Admission on 03/06/2025   Component Date Value Ref Range Status    Hepatitis B Surface Ag 03/07/2025 Non-Reactive  Non-Reactive Final    Hep A IgM 03/07/2025 Non-Reactive  Non-Reactive Final    Hep B C IgM 03/07/2025 Non-Reactive  Non-Reactive Final    Hepatitis C Ab 03/07/2025 Reactive (A)  Non-Reactive Final   Admission on 03/06/2025, Discharged on 03/06/2025   Component Date Value Ref Range Status    Glucose 03/06/2025 99  65 - 99 mg/dL Final    BUN 03/06/2025 9  6 - 20 mg/dL Final    Creatinine 03/06/2025 0.71  0.57 - 1.00 mg/dL Final    Sodium 03/06/2025 136  136 - 145 mmol/L Final    Potassium 03/06/2025 4.0  3.5 - 5.2 mmol/L Final    Slight hemolysis detected by analyzer. Result may be falsely elevated.    Chloride 03/06/2025 101  98 - 107 mmol/L Final    CO2 03/06/2025 25.2  22.0 - 29.0 mmol/L Final    Calcium 03/06/2025 8.9  8.6 - 10.5 mg/dL Final    Total Protein 03/06/2025 7.1  6.0 - 8.5 g/dL Final    Albumin 03/06/2025 4.3  3.5 - 5.2 g/dL Final    ALT (SGPT) 03/06/2025 16  1 - 33 U/L Final    AST (SGOT) 03/06/2025 15  1 - 32 U/L Final    Alkaline Phosphatase 03/06/2025 43  39 - 117 U/L Final    Total Bilirubin 03/06/2025 0.2  0.0 - 1.2 mg/dL Final    Globulin 03/06/2025 2.8  gm/dL Final    A/G Ratio 03/06/2025 1.5  g/dL Final    BUN/Creatinine Ratio 03/06/2025 12.7  7.0 - 25.0 Final    Anion Gap 03/06/2025 9.8  5.0 - 15.0 mmol/L Final    eGFR 03/06/2025 108.3  >60.0 mL/min/1.73 Final    Color, UA 03/06/2025 Yellow  Yellow, Straw Final    Appearance, UA 03/06/2025 Cloudy (A)  Clear Final    pH, UA 03/06/2025 6.5  5.0 - 8.0 Final    Specific Gravity, UA 03/06/2025 <=1.005  1.005 - 1.030 Final    Glucose, UA 03/06/2025 Negative  Negative Final    Ketones, UA 03/06/2025 Negative  Negative Final    Bilirubin, UA 03/06/2025 Negative  Negative Final    Blood, UA 03/06/2025 Negative   Negative Final    Protein, UA 03/06/2025 Negative  Negative Final    Leuk Esterase, UA 03/06/2025 Moderate (2+) (A)  Negative Final    Nitrite, UA 03/06/2025 Negative  Negative Final    Urobilinogen, UA 03/06/2025 0.2 E.U./dL  0.2 - 1.0 E.U./dL Final    THC, Screen, Urine 03/06/2025 Negative  Negative Final    Phencyclidine (PCP), Urine 03/06/2025 Negative  Negative Final    Cocaine Screen, Urine 03/06/2025 Negative  Negative Final    Methamphetamine, Ur 03/06/2025 Negative  Negative Final    Opiate Screen 03/06/2025 Positive (A)  Negative Final    Amphetamine Screen, Urine 03/06/2025 Negative  Negative Final    Benzodiazepine Screen, Urine 03/06/2025 Negative  Negative Final    Tricyclic Antidepressants Screen 03/06/2025 Negative  Negative Final    Methadone Screen, Urine 03/06/2025 Negative  Negative Final    Barbiturates Screen, Urine 03/06/2025 Negative  Negative Final    Oxycodone Screen, Urine 03/06/2025 Negative  Negative Final    Buprenorphine, Screen, Urine 03/06/2025 Negative  Negative Final    Ethanol 03/06/2025 <10  0 - 10 mg/dL Final    Ethanol % 03/06/2025 <0.010  % Final    Magnesium 03/06/2025 2.0  1.6 - 2.6 mg/dL Final    WBC 03/06/2025 7.62  3.40 - 10.80 10*3/mm3 Final    RBC 03/06/2025 4.24  3.77 - 5.28 10*6/mm3 Final    Hemoglobin 03/06/2025 12.8  12.0 - 15.9 g/dL Final    Hematocrit 03/06/2025 38.3  34.0 - 46.6 % Final    MCV 03/06/2025 90.3  79.0 - 97.0 fL Final    MCH 03/06/2025 30.2  26.6 - 33.0 pg Final    MCHC 03/06/2025 33.4  31.5 - 35.7 g/dL Final    RDW 03/06/2025 12.7  12.3 - 15.4 % Final    RDW-SD 03/06/2025 41.7  37.0 - 54.0 fl Final    MPV 03/06/2025 8.8  6.0 - 12.0 fL Final    Platelets 03/06/2025 298  140 - 450 10*3/mm3 Final    Neutrophil % 03/06/2025 54.9  42.7 - 76.0 % Final    Lymphocyte % 03/06/2025 34.6  19.6 - 45.3 % Final    Monocyte % 03/06/2025 7.7  5.0 - 12.0 % Final    Eosinophil % 03/06/2025 1.8  0.3 - 6.2 % Final    Basophil % 03/06/2025 0.7  0.0 - 1.5 % Final     Immature Grans % 03/06/2025 0.3  0.0 - 0.5 % Final    Neutrophils, Absolute 03/06/2025 4.18  1.70 - 7.00 10*3/mm3 Final    Lymphocytes, Absolute 03/06/2025 2.64  0.70 - 3.10 10*3/mm3 Final    Monocytes, Absolute 03/06/2025 0.59  0.10 - 0.90 10*3/mm3 Final    Eosinophils, Absolute 03/06/2025 0.14  0.00 - 0.40 10*3/mm3 Final    Basophils, Absolute 03/06/2025 0.05  0.00 - 0.20 10*3/mm3 Final    Immature Grans, Absolute 03/06/2025 0.02  0.00 - 0.05 10*3/mm3 Final    nRBC 03/06/2025 0.0  0.0 - 0.2 /100 WBC Final    Fentanyl, Urine 03/06/2025 Negative  Negative Final    RBC, UA 03/06/2025 0-2  None Seen, 0-2 /HPF Final    WBC, UA 03/06/2025 6-10 (A)  None Seen, 0-2 /HPF Final    Bacteria, UA 03/06/2025 2+ (A)  None Seen /HPF Final    Squamous Epithelial Cells, UA 03/06/2025 3-6 (A)  None Seen, 0-2 /HPF Final    Hyaline Casts, UA 03/06/2025 None Seen  None Seen /LPF Final    Methodology 03/06/2025 Manual Light Microscopy   Final    Extra Tube 03/06/2025 Hold for add-ons.   Final    Auto resulted.    QT Interval 03/06/2025 382  ms Final    QTC Interval 03/06/2025 424  ms Final        Condition on Discharge:  improved    Vital Signs  Temp:  [98.2 °F (36.8 °C)-98.3 °F (36.8 °C)] 98.3 °F (36.8 °C)  Heart Rate:  [66-89] 66  Resp:  [16-18] 18  BP: ()/(63-70) 99/63      Discharge Disposition:  Home or Self Care    Discharge Medications:     Discharge Medications        New Medications        Instructions Start Date   cefdinir 300 MG capsule  Commonly known as: OMNICEF   300 mg, Oral, Every 12 Hours Scheduled      escitalopram 10 MG tablet  Commonly known as: LEXAPRO   10 mg, Oral, Daily   Start Date: March 11, 2025     traZODone 50 MG tablet  Commonly known as: DESYREL   50 mg, Oral, Nightly PRN             Continue These Medications        Instructions Start Date   atomoxetine 40 MG capsule  Commonly known as: STRATTERA   40 mg, Daily             Stop These Medications      gabapentin 300 MG capsule  Commonly known as:  NEURONTIN              Discharge Diet: Regular     Activity at Discharge: As tolerated     Follow-up Appointments    [facility]  [address], [place], [place] [address] [phone]     March 21 2025 at 8:00am with Graciela.       Time: I spent  < 30  minutes on this discharge activity which included: face-to-face encounter with the patient, reviewing the data in the system, coordination of the care with the nursing staff as well as consultants, documentation, and entering orders.        Clinician:   Angelika Larson MD  03/10/25  12:19 EDT

## 2025-03-11 NOTE — PAYOR COMM NOTE
"Keshia Wolff (44 y.o. Female)       Date of Birth   1980    Social Security Number       Address   [address] [facility] [address] [place] [address]    Home Phone   [phone]    MRN   [id number]       Hoahaoism   Anabaptism    Marital Status                               Admission Date   3/6/2025    Admission Type   Emergency    Admitting Provider   Hailee Mcpherson MD    Attending Provider       Department, Room/Bed   [facility] ADULT PSYCHIATRIC, 1018/2S       Discharge Date   3/10/2025    Discharge Disposition   Home or Self Care    Discharge Destination                                 Attending Provider: (none)   Allergies: No Known Allergies    Isolation: None   Infection: None   Code Status: Prior    Ht: 177.8 cm (70\")   Wt: 71.3 kg (157 lb 3.2 oz)    Admission Cmt: None   Principal Problem: Suicidal ideations [R45.851]                   Active Insurance as of 3/6/2025       Primary Coverage       Payor Plan Insurance Group Employer/Plan Group    ANTHEM BLUE CROSS ANTHEM BLUE CROSS BLUE SHIELD PPO [id number]       Payor Plan Address Payor Plan Phone Number Payor Plan Fax Number Effective Dates    [address] [phone]  2024 - None Entered    [facility] [address]         Subscriber Name Subscriber Birth Date Member ID       KESHIA WOLFF 1980 [id number]                     Emergency Contacts        (Rel.) Home Phone Work Phone Mobile Phone    Anu Arguello (Daughter) -- -- [phone]          PLEASE ATTACH THE INCLUDED CLINICAL INFORMATION TO AUTHORIZATION NUMBER [id number]      RETURN FAX NUMBER [phone]     PATIENT NAME:  KESHIA WOLFF  :  1980    CLINICAL HAS PREVIOUSLY BEEN SUBMITTED FOR DATES 2025-2025.  INCLUDED IS A CLINICAL UPDATE FOR DATES OF SERVICE 2025-2025.  THE PATIENT WAS DISCHARGED ON 03/10/2025 (DISCHARGE SUMMARY INCLUDED)  AUTHORIZATION REQUESTED FOR ALL DATES (2025-2025).   " "  ADMISSION DATE:  2025   DISCHARGE DATE:  03/10/2025     FACILITY:  [facility]  NPI:  [id number]  TAX ID:  [id number]  ADDRESS:  [address] [place], [place] [address] [address]     ATTENDING MD:  DR. ANGELIKA PRYOR  NPI:  [id number]  ADDRESS:  SAME AS FACILITY     UR CONTACT:  MILTON FLETCHER RN  PHONE:  [phone]  FAX:  [phone]        DIAGNOSES:  F33.2 - MDD (MAJOR DEPRESSIVE DISORDER), RECURRENT, SEVERE, WITHOUT PSYCHOSIS  F90.9 - ADHD          MD PROGRESS NOTE DATED 2025 IS COPIED BELOW:       Angelika Pryor MD   Physician  Psychiatry     Progress Notes      Signed     Date of Service: 25  Creation Time: 25     Signed       Expand All Collapse All    INPATIENT PSYCHIATRIC PROGRESS NOTE     Name:  Keshia Jimenez  :  1980  MRN:  [id number]  Visit Number:  [id number]  Length of stay:  2     SUBJECTIVE     CC/Focus of Exam: depression, SI     INTERVAL HISTORY:  The patient reports she is feeling more anxious and she has not been able to sleep good as she is not comfortable when she is away from home. She states she has realized that her kids love her and she has a lot to live for.   Depression rating 3/10  Anxiety rating 3/10  Sleep: Not good  Withdrawal sx: None  Cravin/10     Review of Systems   HENT: Negative.     Respiratory: Negative.     Cardiovascular: Negative.    Psychiatric/Behavioral:  Positive for decreased concentration. The patient is nervous/anxious.          OBJECTIVE     Temp:  [97.1 °F (36.2 °C)-98.9 °F (37.2 °C)] 98.6 °F (37 °C)  Heart Rate:  [69-78] 78  Resp:  [16-18] 18  BP: ()/(64-84) 99/64     MENTAL STATUS EXAM:  Appearance:Casually dressed, good hygeine.   Cooperation:Cooperative  Psychomotor: No psychomotor agitation/retardation, No EPS, No motor tics  Speech-normal rate, amount.  Mood \"depressed and anxious\"   Affect-congruent, appropriate, stable  Thought Content-goal directed, no delusional material present  Thought process-linear, " organized.  Suicidality: No SI  Homicidality: No HI  Perception: No AH/VH  Insight-fair   Judgement-fair     Lab Results (last 24 hours)         ** No results found for the last 24 hours. **                              Imaging Results (Last 24 Hours)         ** No results found for the last 24 hours. **                   ECG/EMG Results (most recent)         None                ALLERGIES: Patient has no known allergies.     Medication Review:   Scheduled Medications:    Scheduled Medication   atomoxetine, 40 mg, Oral, Daily  cefdinir, 300 mg, Oral, Q12H  nicotine, 1 patch, Transdermal, Q24H            PRN Medications:    PRN Medication     acetaminophen    aluminum-magnesium hydroxide-simethicone    benzonatate    benztropine **OR** benztropine    famotidine    hydrOXYzine    ibuprofen    loperamide    magnesium hydroxide    ondansetron ODT    polyethylene glycol    sodium chloride    traZODone      All medications reviewed.     ASSESSMENT & PLAN:       Suicidal ideations  -SP 3       MDD (major depressive disorder), recurrent severe, without psychosis  -Start escitalopram 10 mg daily       ADHD  -Strattera       UTI  -Omnicef       Nicotine use disorder  -Nicotine replacement     Special precautions: Special Precautions Level 3 (q15 min checks) .     Behavioral Health Treatment Plan and Problem List: I have reviewed and approved the Behavioral Health Treatment Plan and Problem list.  The patient has had a chance to review and agrees with the treatment plan.     Copied text in portions of this note has been reviewed and is accurate as of 03/08/25            Clinician:  Angelika Larson MD  03/08/25  08:46 EST                     MD PROGRESS NOTE DATED 03/09/2025 IS COPIED BELOW:       Angelika Larson MD   Physician  Psychiatry     Progress Notes      Signed     Date of Service: 03/09/25 1219  Creation Time: 03/09/25 1219     Signed       Expand All Collapse All    INPATIENT PSYCHIATRIC PROGRESS NOTE     Name:  Keshia  "Tony  :  1980  MRN:  [id number]  Visit Number:  [id number]  Length of stay:  3     SUBJECTIVE     CC/Focus of Exam: depression, SI     INTERVAL HISTORY:  The patient reports she is feeling better. She took the Lexapro and she feels it is helping.   Depression rating 2/10  Anxiety rating 2/10  Sleep: good  Withdrawal sx: None  Cravin/10     Review of Systems   HENT: Negative.     Respiratory: Negative.     Cardiovascular: Negative.          OBJECTIVE     Temp:  [98.1 °F (36.7 °C)-98.7 °F (37.1 °C)] 98.1 °F (36.7 °C)  Heart Rate:  [64-88] 64  Resp:  [16] 16  BP: (110-112)/(70-71) 112/71     MENTAL STATUS EXAM:  Appearance:Casually dressed, good hygeine.   Cooperation:Cooperative  Psychomotor: No psychomotor agitation/retardation, No EPS, No motor tics  Speech-normal rate, amount.  Mood \"better\"   Affect-congruent, appropriate, stable  Thought Content-goal directed, no delusional material present  Thought process-linear, organized.  Suicidality: No SI  Homicidality: No HI  Perception: No AH/VH  Insight-fair   Judgement-fair     Lab Results (last 24 hours)         ** No results found for the last 24 hours. **                              Imaging Results (Last 24 Hours)         ** No results found for the last 24 hours. **                   ECG/EMG Results (most recent)         None                ALLERGIES: Patient has no known allergies.     Medication Review:   Scheduled Medications:    Scheduled Medication   atomoxetine, 40 mg, Oral, Daily  cefdinir, 300 mg, Oral, Q12H  escitalopram, 10 mg, Oral, Daily  nicotine, 1 patch, Transdermal, Q24H            PRN Medications:    PRN Medication     acetaminophen    aluminum-magnesium hydroxide-simethicone    benzonatate    benztropine **OR** benztropine    famotidine    hydrOXYzine    ibuprofen    loperamide    magnesium hydroxide    ondansetron ODT    polyethylene glycol    sodium chloride    traZODone      All medications reviewed.     ASSESSMENT & PLAN:       " Suicidal ideations  -SP 3       MDD (major depressive disorder), recurrent severe, without psychosis  -Started escitalopram 10 mg daily       ADHD  -Strattera       UTI  -Omnicef       Nicotine use disorder  -Nicotine replacement     Special precautions: Special Precautions Level 3 (q15 min checks) .     Behavioral Health Treatment Plan and Problem List: I have reviewed and approved the Behavioral Health Treatment Plan and Problem list.  The patient has had a chance to review and agrees with the treatment plan.     Copied text in portions of this note has been reviewed and is accurate as of 03/09/25            Clinician:  Angelika Larson MD  03/09/25  12:19 EDT                     NURSING NOTES DATED 03/09/2025 COPIED BELOW:  PATIENT REPORTING SI TO NURSING    Row Name 03/09/25 0938 03/09/25 0930 03/09/25 0841 03/09/25 0838 03/09/25 0800   Pain/Comfort/Sleep   Preferred Pain Scale number (Numeric Rating Pain Scale)  -RL -- -- number (Numeric Rating Pain Scale)  -RL --   (0-10) Pain Rating: Rest 6  -RL -- -- 8  -RL --   Pain Location -- -- -- head  -RL --   Pain Side/Orientation -- -- -- generalized  -RL --   Pain Description -- -- -- aching  -RL --   Pain Management Interventions -- -- -- pain medication given  -RL --   Response to Pain Interventions interventions effective per patient  -RL -- -- -- --   POSS (Pasero Opioid-Induced Sedation Scale)   POSS (Pasero Opioid-Induced Sed Scale) -- -- 1 - Awake and alert  -LB -- --   Sleep/Rest   Sleep/Rest/Relaxation -- -- difficulty falling asleep  -LB -- --   Coping/Psychosocial   Verbalized Emotional State -- -- acceptance  -LB -- --   Plan of Care Reviewed With -- -- patient  -LB -- --   Patient Agreement with Plan of Care -- -- agrees  -LB -- --   Trust Relationship/Rapport -- -- care explained;questions encouraged;reassurance provided  -LB -- --   Coping/Psychosocial Interventions   Supportive Measures -- -- active listening utilized  -LB -- --   Group Therapy Session    Group Attendance -- attended group session  -SB -- -- --   Time Session Began -- 0930 -SB -- -- --   Time Session Ended -- 0945 -SB -- -- --   Total Time (minutes) -- 15  -SB -- -- --   Group Type -- life skill  -SB -- -- --   Group Topic Covered -- goal setting  -SB -- -- --   Group Session Detail -- GOALS  -SB -- -- --   Patient Participation/Contribution -- cooperative with task  -SB -- -- --   Patient Participation Detail -- GO HOME  -SB -- -- --   Affect During Group -- affect consistent with mood  -SB -- -- --   Degree of Insight -- moderate  -SB -- -- --   HEENT   HEENT WDL -- -- WDL  -LB -- --   Mouth/Teeth WDL   Mouth/Teeth WDL -- -- WDL  -LB -- --   Cognitive   Additional Documentation -- -- CAM (Confusion Assessment Method) (Group);Cognitive/Neuro/Behavioral WDL (Group)  -LB -- --   Cognitive/Neuro/Behavioral WDL   Cognitive/Neuro/Behavioral WDL -- -- X;mood/behavior  -LB -- --   Arousal Level -- -- opens eyes spontaneously  -LB -- --   Orientation -- -- oriented x 4  -LB -- --   Speech -- -- logical;spontaneous;clear  -LB -- --   Mood/Behavior -- -- anxious;sad  -LB -- --   CAM (Confusion Assessment Method)   (1) Acute Onset/Fluctuating Course -- -- no  -LB -- --   (2) Inattention -- -- no  -LB -- --   (3) Disorganized Thinking -- -- no  -LB -- --   (4) Altered Level of Consciousness -- -- no  -LB -- --   Cognitive Interventions   Communication Enhancement Strategies -- -- extra time allowed for response  -LB -- --   Neuro   Additional Documentation -- -- [place] Coma Scale (Group)  -LB -- --   [place] Coma Scale   Best Eye Response -- -- 4-->(E4) spontaneous  -LB -- --   Best Motor Response -- -- 6-->(M6) obeys commands  -LB -- --   Best Verbal Response -- -- 5-->(V5) oriented  -LB -- --   [place] Coma Scale Score -- -- 15  -LB -- --   Assessment Qualifiers -- -- patient not sedated/intubated  -LB -- --   Motor Response   Motor Response -- -- general motor response  -LB -- --   General Motor  Response -- -- purposeful motor response  -LB -- --   Behavioral   General Appearance WDL -- -- WDL  -LB -- --   Behavior WDL   Behavior WDL -- -- WDL  -LB -- --   Emotion Mood WDL   Emotion/Mood/Affect WDL -- -- X;emotion mood  -LB -- --   Affect -- -- affect consistent with mood  -LB -- --   Emotion/Mood -- -- anxious;depressed  -LB -- --   Patient rated anxiety level -- -- 3  -LB -- --   Patient rated depression level -- -- 2  -LB -- --   Speech WDL   Speech WDL -- -- WDL  -LB -- --   Perceptual State WDL   Perceptual State WDL -- -- WDL  -LB -- --   Thought Process WDL   Thought Process WDL -- -- X;judgment and insight  -LB -- --   Delusions -- -- no delusions  -LB -- --   Judgment and Insight -- -- insight not appropriate to situation;judgment not appropriate to situation  -LB -- --   Thought Content -- -- helplessness;hopelessness;suicidal thoughts;worthlessness  -LB -- --   Thought Process -- -- disorganized  -LB           Scheduled Meds Sorted by Name  for Keshia Jimenez as of 03/01/25 through 3/10/25    1 Day 3 Days 7 Days 10 Days < Today >   Legend:       Medications 03/01 03/02 03/03 03/04 03/05 03/06 03/07 03/08 03/09 03/10   atomoxetine (STRATTERA) capsule 40 mg  Dose: 40 mg  Freq: Daily Route: PO  Start: 03/07/25 0900 End: 03/10/25 1534   Admin Instructions:             0857      0837      0836      0841     1534-D/C'd      cefdinir (OMNICEF) capsule 300 mg  Dose: 300 mg  Freq: Every 12 Hours Scheduled Route: PO  Indications of Use: URINARY TRACT INFECTION  Start: 03/07/25 0900 End: 03/10/25 1534   Admin Instructions:             0857     2048      0837     2035      0836     2105      0841     1534-D/C'd      escitalopram (LEXAPRO) tablet 10 mg  Dose: 10 mg  Freq: Daily Route: PO  Start: 03/08/25 0945 End: 03/10/25 1534   Admin Instructions:              1048      0836      0841     1534-D/C'd      fluconazole (DIFLUCAN) tablet 150 mg  Dose: 150 mg  Freq: Once Route: PO  Indications of Use:  VULVOVAGINAL CANDIDIASIS  Start: 25 1200 End: 25 1139   Admin Instructions:              1139          nicotine (NICODERM CQ) 21 MG/24HR patch 1 patch  Dose: 1 patch  Freq: Every 24 Hours Scheduled Route: TD  Start: 25 0900 End: 03/10/25 1534   Admin Instructions:             0857      0835     0836      0807     0836      0822     0841     1320 [C]     1534-D/C'd            PRN MEDICATION RECEIVED:    traZODone (DESYREL) tablet 50 mg  Dose: 50 mg  Freq: Nightly PRN Route: PO  PRN Reason: Sleep  Start: 25 End: 03/10/25 1534  RECEIVED ON 2025 AT 2035 PM EST  RECEIVED ON 2025 AT 2105 PM EST             Discharge Summary        Angelika Larson MD at 03/10/25 1207          :  1980  MRN:  [id number]  Visit Number:  [id number]      Date of Admission:3/6/2025   Date of Discharge:  3/10/2025    Discharge Diagnosis:  Principal Problem:    Suicidal ideations  Active Problems:    MDD (major depressive disorder), recurrent severe, without psychosis    ADHD    UTI      Admission Diagnosis:  Suicidal ideations [R45.851]     BROOKLYNN Jimenez is a 44 y.o. female who was admitted on 3/6/2025 with complaints of suicidal ideations.   For details please see H&P dated 3/7/25.     Hospital Course  Patient is a 44 y.o. female presented with depression and suicidal ideations. The patient was admitted to the [facility] adult psych unit for safety, further evaluation and treatment.  The patient was started on escitalopram 10 mg daily to help with the symptoms of depression. The patient was able to take the medication without any adverse effects and she reported it helped her feel better. She was continued on atomoxetine for ADHD and cefdinir was added tor UTI. She also took trazodone to help her sleep and wanted to continue taking it at discharge.  The patient was also able to take part in individual and group counseling sessions and work on appropriate coping skills. She admitted that  "she tried to take care of everyone except herself and planned to make some changes and have better boundaries in her life.   The patient made steady improvement in her mood and expressed feeling more positive and hopeful about future. Sleep and appetite were improved.  The day of discharge the patient was calm, cooperative and pleasant. Mood was reported to be good, and denied SI/HI/AVH. Also reported no medication side effects.        Mental Status Exam upon discharge:   Mood \"good\"   Affect-congruent, appropriate, stable  Thought Content-goal directed, no delusional material present  Thought process-linear, organized.  Suicidality: No SI  Homicidality: No HI  Perception: No AH/VH    Procedures Performed         Consults:   Consults       No orders found from 2/5/2025 to 3/7/2025.            Pertinent Test Results:   Admission on 03/06/2025   Component Date Value Ref Range Status    Hepatitis B Surface Ag 03/07/2025 Non-Reactive  Non-Reactive Final    Hep A IgM 03/07/2025 Non-Reactive  Non-Reactive Final    Hep B C IgM 03/07/2025 Non-Reactive  Non-Reactive Final    Hepatitis C Ab 03/07/2025 Reactive (A)  Non-Reactive Final   Admission on 03/06/2025, Discharged on 03/06/2025   Component Date Value Ref Range Status    Glucose 03/06/2025 99  65 - 99 mg/dL Final    BUN 03/06/2025 9  6 - 20 mg/dL Final    Creatinine 03/06/2025 0.71  0.57 - 1.00 mg/dL Final    Sodium 03/06/2025 136  136 - 145 mmol/L Final    Potassium 03/06/2025 4.0  3.5 - 5.2 mmol/L Final    Slight hemolysis detected by analyzer. Result may be falsely elevated.    Chloride 03/06/2025 101  98 - 107 mmol/L Final    CO2 03/06/2025 25.2  22.0 - 29.0 mmol/L Final    Calcium 03/06/2025 8.9  8.6 - 10.5 mg/dL Final    Total Protein 03/06/2025 7.1  6.0 - 8.5 g/dL Final    Albumin 03/06/2025 4.3  3.5 - 5.2 g/dL Final    ALT (SGPT) 03/06/2025 16  1 - 33 U/L Final    AST (SGOT) 03/06/2025 15  1 - 32 U/L Final    Alkaline Phosphatase 03/06/2025 43  39 - 117 U/L " Final    Total Bilirubin 03/06/2025 0.2  0.0 - 1.2 mg/dL Final    Globulin 03/06/2025 2.8  gm/dL Final    A/G Ratio 03/06/2025 1.5  g/dL Final    BUN/Creatinine Ratio 03/06/2025 12.7  7.0 - 25.0 Final    Anion Gap 03/06/2025 9.8  5.0 - 15.0 mmol/L Final    eGFR 03/06/2025 108.3  >60.0 mL/min/1.73 Final    Color, UA 03/06/2025 Yellow  Yellow, Straw Final    Appearance, UA 03/06/2025 Cloudy (A)  Clear Final    pH, UA 03/06/2025 6.5  5.0 - 8.0 Final    Specific Gravity, UA 03/06/2025 <=1.005  1.005 - 1.030 Final    Glucose, UA 03/06/2025 Negative  Negative Final    Ketones, UA 03/06/2025 Negative  Negative Final    Bilirubin, UA 03/06/2025 Negative  Negative Final    Blood, UA 03/06/2025 Negative  Negative Final    Protein, UA 03/06/2025 Negative  Negative Final    Leuk Esterase, UA 03/06/2025 Moderate (2+) (A)  Negative Final    Nitrite, UA 03/06/2025 Negative  Negative Final    Urobilinogen, UA 03/06/2025 0.2 E.U./dL  0.2 - 1.0 E.U./dL Final    THC, Screen, Urine 03/06/2025 Negative  Negative Final    Phencyclidine (PCP), Urine 03/06/2025 Negative  Negative Final    Cocaine Screen, Urine 03/06/2025 Negative  Negative Final    Methamphetamine, Ur 03/06/2025 Negative  Negative Final    Opiate Screen 03/06/2025 Positive (A)  Negative Final    Amphetamine Screen, Urine 03/06/2025 Negative  Negative Final    Benzodiazepine Screen, Urine 03/06/2025 Negative  Negative Final    Tricyclic Antidepressants Screen 03/06/2025 Negative  Negative Final    Methadone Screen, Urine 03/06/2025 Negative  Negative Final    Barbiturates Screen, Urine 03/06/2025 Negative  Negative Final    Oxycodone Screen, Urine 03/06/2025 Negative  Negative Final    Buprenorphine, Screen, Urine 03/06/2025 Negative  Negative Final    Ethanol 03/06/2025 <10  0 - 10 mg/dL Final    Ethanol % 03/06/2025 <0.010  % Final    Magnesium 03/06/2025 2.0  1.6 - 2.6 mg/dL Final    WBC 03/06/2025 7.62  3.40 - 10.80 10*3/mm3 Final    RBC 03/06/2025 4.24  3.77 - 5.28  10*6/mm3 Final    Hemoglobin 03/06/2025 12.8  12.0 - 15.9 g/dL Final    Hematocrit 03/06/2025 38.3  34.0 - 46.6 % Final    MCV 03/06/2025 90.3  79.0 - 97.0 fL Final    MCH 03/06/2025 30.2  26.6 - 33.0 pg Final    MCHC 03/06/2025 33.4  31.5 - 35.7 g/dL Final    RDW 03/06/2025 12.7  12.3 - 15.4 % Final    RDW-SD 03/06/2025 41.7  37.0 - 54.0 fl Final    MPV 03/06/2025 8.8  6.0 - 12.0 fL Final    Platelets 03/06/2025 298  140 - 450 10*3/mm3 Final    Neutrophil % 03/06/2025 54.9  42.7 - 76.0 % Final    Lymphocyte % 03/06/2025 34.6  19.6 - 45.3 % Final    Monocyte % 03/06/2025 7.7  5.0 - 12.0 % Final    Eosinophil % 03/06/2025 1.8  0.3 - 6.2 % Final    Basophil % 03/06/2025 0.7  0.0 - 1.5 % Final    Immature Grans % 03/06/2025 0.3  0.0 - 0.5 % Final    Neutrophils, Absolute 03/06/2025 4.18  1.70 - 7.00 10*3/mm3 Final    Lymphocytes, Absolute 03/06/2025 2.64  0.70 - 3.10 10*3/mm3 Final    Monocytes, Absolute 03/06/2025 0.59  0.10 - 0.90 10*3/mm3 Final    Eosinophils, Absolute 03/06/2025 0.14  0.00 - 0.40 10*3/mm3 Final    Basophils, Absolute 03/06/2025 0.05  0.00 - 0.20 10*3/mm3 Final    Immature Grans, Absolute 03/06/2025 0.02  0.00 - 0.05 10*3/mm3 Final    nRBC 03/06/2025 0.0  0.0 - 0.2 /100 WBC Final    Fentanyl, Urine 03/06/2025 Negative  Negative Final    RBC, UA 03/06/2025 0-2  None Seen, 0-2 /HPF Final    WBC, UA 03/06/2025 6-10 (A)  None Seen, 0-2 /HPF Final    Bacteria, UA 03/06/2025 2+ (A)  None Seen /HPF Final    Squamous Epithelial Cells, UA 03/06/2025 3-6 (A)  None Seen, 0-2 /HPF Final    Hyaline Casts, UA 03/06/2025 None Seen  None Seen /LPF Final    Methodology 03/06/2025 Manual Light Microscopy   Final    Extra Tube 03/06/2025 Hold for add-ons.   Final    Auto resulted.    QT Interval 03/06/2025 382  ms Final    QTC Interval 03/06/2025 424  ms Final        Condition on Discharge:  improved    Vital Signs  Temp:  [98.2 °F (36.8 °C)-98.3 °F (36.8 °C)] 98.3 °F (36.8 °C)  Heart Rate:  [66-89] 66  Resp:  [16-18]  18  BP: ()/(63-70) 99/63      Discharge Disposition:  Home or Self Care    Discharge Medications:     Discharge Medications        New Medications        Instructions Start Date   cefdinir 300 MG capsule  Commonly known as: OMNICEF   300 mg, Oral, Every 12 Hours Scheduled      escitalopram 10 MG tablet  Commonly known as: LEXAPRO   10 mg, Oral, Daily   Start Date: March 11, 2025     traZODone 50 MG tablet  Commonly known as: DESYREL   50 mg, Oral, Nightly PRN             Continue These Medications        Instructions Start Date   atomoxetine 40 MG capsule  Commonly known as: STRATTERA   40 mg, Daily             Stop These Medications      gabapentin 300 MG capsule  Commonly known as: NEURONTIN              Discharge Diet: Regular     Activity at Discharge: As tolerated     Follow-up Appointments    [facility]  [address] , [place] [id number] [phone]     March 21 2025 at 8:00am with Graciela.       Time: I spent  < 30  minutes on this discharge activity which included: face-to-face encounter with the patient, reviewing the data in the system, coordination of the care with the nursing staff as well as consultants, documentation, and entering orders.        Clinician:   Angelika Larson MD  03/10/25  12:19 EDT    Electronically signed by Angelika Larson MD at 03/10/25 4648

## (undated) DEVICE — SPNG GZ WOVN 4X4IN 12PLY 10/BX STRL

## (undated) DEVICE — HOLDER: Brand: DEROYAL

## (undated) DEVICE — PAD GRND REM POLYHESIVE A/ DISP

## (undated) DEVICE — UNDERCAST PADDING: Brand: DEROYAL

## (undated) DEVICE — PK EXTREM UPPR 70

## (undated) DEVICE — DISPOSABLE TOURNIQUET CUFF SINGLE BLADDER, SINGLE PORT AND LUER LOCK CONNECTOR: Brand: COLOR CUFF

## (undated) DEVICE — GLV SURG TRIUMPH ORTHO W/ALOE PF LTX 8.5 STRL

## (undated) DEVICE — APPL CHLORAPREP W/TINT 26ML ORNG

## (undated) DEVICE — BNDG ELAS MATRX  2IN 5YD LF STRL

## (undated) DEVICE — DRSNG WND GZ CURAD OIL EMULSION 3X3IN STRL

## (undated) DEVICE — BNDG ELAS CO-FLEX SLF ADHR 2IN 5YD LF STRL

## (undated) DEVICE — BNDG ELAS CO-FLEX SLF ADHR 4IN5YD LF STRL

## (undated) DEVICE — SYS SKIN CLS DERMABOND PRINEO W/22CM MESH TP

## (undated) DEVICE — SUT ETHLN 3/0 FS1 663G

## (undated) DEVICE — SPNG GZ STRL 2S 4X4 12PLY

## (undated) DEVICE — DRSNG TELFA PAD NONADH STR 1S 3X8IN

## (undated) DEVICE — SPLNT ORTHOGLASS 3INX15FT SN